# Patient Record
Sex: FEMALE | Race: WHITE | Employment: PART TIME | URBAN - METROPOLITAN AREA
[De-identification: names, ages, dates, MRNs, and addresses within clinical notes are randomized per-mention and may not be internally consistent; named-entity substitution may affect disease eponyms.]

---

## 2017-01-17 ENCOUNTER — TELEPHONE (OUTPATIENT)
Dept: FAMILY MEDICINE CLINIC | Facility: CLINIC | Age: 42
End: 2017-01-17

## 2017-01-17 NOTE — TELEPHONE ENCOUNTER
Patient calling to request vaccines.  Patient states her and her  are leaving on a trip to [facility]  And will need the HP (A &B) vaccine, also the Typhoid vaccine and will also need the Antimalaria pill   She wants to double check she is up to date wi

## 2017-01-19 NOTE — TELEPHONE ENCOUNTER
Dr Nichelle Ram, pt needs following vaccines: Hep A, Hep B, Tdap, and Typhoid. Also, Rx for malaria. Pt will be leaving for [place] on March 5, and will be there for 14 days. Pt was advised to get Typhoid vaccine at the travel clinic. She is current on Tdap.   P

## 2017-01-24 RX ORDER — ATOVAQUONE AND PROGUANIL HYDROCHLORIDE 250; 100 MG/1; MG/1
1 TABLET, FILM COATED ORAL DAILY
Qty: 25 TABLET | Refills: 0 | Status: SHIPPED | OUTPATIENT
Start: 2017-01-24 | End: 2017-02-23

## 2017-01-24 NOTE — TELEPHONE ENCOUNTER
Spoke with pt's spouse Cristina Cho, and nurse visit scheduled for shots. Pt is not planning to become pregnant. They are traveling to [place] [facility] [place] of [facility], and malaria med is recommended there.

## 2017-01-24 NOTE — TELEPHONE ENCOUNTER
Ronal sent to pharmacy. Please inform her she will need additional doses of Hep B and A to be fully vaccinated in the future.

## 2017-01-24 NOTE — TELEPHONE ENCOUNTER
Order for vaccines signed. Typhoid vaccine sent in to pharmacy. Where exactly is she going in [facility]? Not all areas require malaria medication so it would be good to know where they are going.         Tk Johnson is present in [facility] and if she has

## 2017-01-28 ENCOUNTER — NURSE ONLY (OUTPATIENT)
Dept: FAMILY MEDICINE CLINIC | Facility: CLINIC | Age: 42
End: 2017-01-28

## 2017-01-28 DIAGNOSIS — Z23 NEED FOR HEPATITIS B VACCINATION: Primary | ICD-10-CM

## 2017-01-28 PROCEDURE — 90632 HEPA VACCINE ADULT IM: CPT | Performed by: FAMILY MEDICINE

## 2017-01-28 PROCEDURE — 90472 IMMUNIZATION ADMIN EACH ADD: CPT | Performed by: FAMILY MEDICINE

## 2017-01-28 PROCEDURE — 90471 IMMUNIZATION ADMIN: CPT | Performed by: FAMILY MEDICINE

## 2017-01-28 PROCEDURE — 90746 HEPB VACCINE 3 DOSE ADULT IM: CPT | Performed by: FAMILY MEDICINE

## 2017-01-28 NOTE — PROGRESS NOTES
Pt here for  HEP A & HEP B vaccines. Ordered by Leann Manning under encounter 1/17. Pt tolerated inj well no reactions noted. Pt informed to rtc in 1-2 months for HEP B #2 and 6 mons for HEP B # 3 & HEP A#2. Pt voiced understanding.

## 2017-01-30 ENCOUNTER — TELEPHONE (OUTPATIENT)
Dept: ENDOCRINOLOGY CLINIC | Facility: CLINIC | Age: 42
End: 2017-01-30

## 2017-01-30 NOTE — TELEPHONE ENCOUNTER
Returned call to NOY. Let her know per [facility] ok to take there should be no drug interaction.

## 2017-01-30 NOTE — TELEPHONE ENCOUNTER
Pt. States that she is concerned about having a drug interaction with Typhoid Vaccine Vivotif with her cortisone medication.  Please call to discuss,

## 2017-03-04 ENCOUNTER — NURSE ONLY (OUTPATIENT)
Dept: FAMILY MEDICINE CLINIC | Facility: CLINIC | Age: 42
End: 2017-03-04

## 2017-03-04 DIAGNOSIS — Z23 NEED FOR HEPATITIS B VACCINATION: Primary | ICD-10-CM

## 2017-03-04 PROCEDURE — 90471 IMMUNIZATION ADMIN: CPT | Performed by: FAMILY MEDICINE

## 2017-03-04 PROCEDURE — 90746 HEPB VACCINE 3 DOSE ADULT IM: CPT | Performed by: FAMILY MEDICINE

## 2017-03-04 NOTE — PROGRESS NOTES
Pt is here for Hep B #2. Pt tolerated injection well, and advised to return for Hep B #3 and Hep A #2 on or after July 28. Pt voiced understanding.

## 2017-04-01 RX ORDER — LEVONORGESTREL AND ETHINYL ESTRADIOL 0.1-0.02MG
1 KIT ORAL
Qty: 3 PACKAGE | Refills: 3 | Status: CANCELLED | OUTPATIENT
Start: 2017-04-01

## 2017-04-01 NOTE — TELEPHONE ENCOUNTER
Gynecology Medications  Protocol Criteria:  · Appointment scheduled in the past 12 months or the next 3 months  · Pap smear in the past 12 months  · Pap smear WNL manually verified  Recent Visits       Provider Department Primary Dx    6 months ago Magen Estrada,

## 2017-04-01 NOTE — TELEPHONE ENCOUNTER
From: Lui Giraldo  To:  Dajuan Kevin MD  Sent: 3/24/2017 4:51 AM CDT  Subject: Medication Renewal Request    Original authorizing provider: MD Maria A Pierce would like a refill of the following medications:  Alexsandra Rome

## 2017-04-28 RX ORDER — LEVOTHYROXINE SODIUM 0.1 MG/1
TABLET ORAL
Qty: 30 TABLET | Refills: 1 | Status: SHIPPED | OUTPATIENT
Start: 2017-04-28 | End: 2017-06-24

## 2017-05-17 ENCOUNTER — OFFICE VISIT (OUTPATIENT)
Dept: FAMILY MEDICINE CLINIC | Facility: CLINIC | Age: 42
End: 2017-05-17

## 2017-05-17 VITALS
HEART RATE: 71 BPM | SYSTOLIC BLOOD PRESSURE: 112 MMHG | RESPIRATION RATE: 16 BRPM | DIASTOLIC BLOOD PRESSURE: 60 MMHG | OXYGEN SATURATION: 97 % | TEMPERATURE: 98 F

## 2017-05-17 DIAGNOSIS — R30.0 DYSURIA: Primary | ICD-10-CM

## 2017-05-17 DIAGNOSIS — N30.00 ACUTE CYSTITIS WITHOUT HEMATURIA: ICD-10-CM

## 2017-05-17 PROCEDURE — 87088 URINE BACTERIA CULTURE: CPT | Performed by: NURSE PRACTITIONER

## 2017-05-17 PROCEDURE — 81003 URINALYSIS AUTO W/O SCOPE: CPT | Performed by: NURSE PRACTITIONER

## 2017-05-17 PROCEDURE — 87186 SC STD MICRODIL/AGAR DIL: CPT | Performed by: NURSE PRACTITIONER

## 2017-05-17 PROCEDURE — 87086 URINE CULTURE/COLONY COUNT: CPT | Performed by: NURSE PRACTITIONER

## 2017-05-17 PROCEDURE — 99203 OFFICE O/P NEW LOW 30 MIN: CPT | Performed by: NURSE PRACTITIONER

## 2017-05-17 RX ORDER — NITROFURANTOIN 25; 75 MG/1; MG/1
100 CAPSULE ORAL 2 TIMES DAILY
Qty: 14 CAPSULE | Refills: 0 | Status: SHIPPED | OUTPATIENT
Start: 2017-05-17 | End: 2017-05-22

## 2017-05-17 NOTE — PROGRESS NOTES
CHIEF COMPLAINT:   No chief complaint on file. HPI:   Magaly Fleming is a 42year old female who presents with symptoms of UTI. Complaining of urinary frequency, urgency, dysuria for last 7 days. Symptoms have been progressing since onset.   Treatme Smoking Status: Never Smoker                      Smokeless Status: Never Used                        Alcohol Use: Yes           0.0 oz/week       0 Standard drinks or equivalent per week       Comment: 1 beer per week on average.          REVIEW OF SYSTE Risk and benefits of medication discussed. Stressed importance of completing full course of antibiotic unless told otherwise. Patient Instructions     Bladder Infection, Female (Adult)    Urine is normally doesn't have any bacteria in it.  But bacteria The most common cause of bladder infections is bacteria from the bowels. The bacteria get onto the skin around the opening of the urethra. From there, they can get into the urine and travel up to the bladder, causing inflammation and infection.  This usuall · Urinate more often. Don't try to hold urine in for a long time. · Wear loose-fitting clothes and cotton underwear. Avoid tight-fitting pants. · Improve your diet and prevent constipation.  Eat more fresh fruit and vegetables, and fiber, and less junk an The patient is asked to return in 3 days if not better. Call if fever, vomiting, worsening symptoms.

## 2017-06-12 ENCOUNTER — PATIENT MESSAGE (OUTPATIENT)
Dept: FAMILY MEDICINE CLINIC | Facility: CLINIC | Age: 42
End: 2017-06-12

## 2017-06-14 ENCOUNTER — PATIENT MESSAGE (OUTPATIENT)
Dept: FAMILY MEDICINE CLINIC | Facility: CLINIC | Age: 42
End: 2017-06-14

## 2017-06-14 NOTE — TELEPHONE ENCOUNTER
From: Marcellus Giraldo  To:  Nabor Gudino MD  Sent: 6/12/2017 2:59 PM CDT  Subject: Non-Urgent Medical Question    Hi Dr. Lalo Lennon,    My  (Rodrick Carter) and I got Hepatitis vaccines before we went to [facility], but I know we needed one more do

## 2017-06-16 NOTE — TELEPHONE ENCOUNTER
From: Zach Giraldo  To: Eliceo Mims MD  Sent: 6/14/2017 11:45 AM CDT  Subject: Non-Urgent Medical Question    REsponding to [facility].      We will be out of town July 28th through the beginning of august - would it be ok to get the last shots on sa

## 2017-06-26 RX ORDER — LEVOTHYROXINE SODIUM 0.1 MG/1
TABLET ORAL
Qty: 30 TABLET | Refills: 2 | Status: SHIPPED | OUTPATIENT
Start: 2017-06-26 | End: 2017-09-15

## 2017-08-23 ENCOUNTER — TELEPHONE (OUTPATIENT)
Dept: FAMILY MEDICINE CLINIC | Facility: CLINIC | Age: 42
End: 2017-08-23

## 2017-08-24 NOTE — TELEPHONE ENCOUNTER
LMTCB. YURIY, please schedule nurse visit, as requested. Tomorrow, Fri AM would be great. Thanks.  Orders placed per protocol

## 2017-09-02 ENCOUNTER — NURSE ONLY (OUTPATIENT)
Dept: FAMILY MEDICINE CLINIC | Facility: CLINIC | Age: 42
End: 2017-09-02

## 2017-09-02 DIAGNOSIS — Z23 NEED FOR HEPATITIS A AND B VACCINATION: Primary | ICD-10-CM

## 2017-09-02 PROCEDURE — 90471 IMMUNIZATION ADMIN: CPT | Performed by: FAMILY MEDICINE

## 2017-09-02 PROCEDURE — 90632 HEPA VACCINE ADULT IM: CPT | Performed by: FAMILY MEDICINE

## 2017-09-02 PROCEDURE — 90746 HEPB VACCINE 3 DOSE ADULT IM: CPT | Performed by: FAMILY MEDICINE

## 2017-09-02 PROCEDURE — 90472 IMMUNIZATION ADMIN EACH ADD: CPT | Performed by: FAMILY MEDICINE

## 2017-09-02 NOTE — PROGRESS NOTES
Pt presents to clinic today for Hep A and B vaccines. This is the last dose of both series. Pt tolerated well no s/s of distress noted.

## 2017-09-06 ENCOUNTER — TELEPHONE (OUTPATIENT)
Dept: GASTROENTEROLOGY | Facility: CLINIC | Age: 42
End: 2017-09-06

## 2017-09-06 DIAGNOSIS — Z86.010 HISTORY OF COLONIC POLYPS: Primary | ICD-10-CM

## 2017-09-06 DIAGNOSIS — Z80.0 FAMILY HISTORY OF COLON CANCER: ICD-10-CM

## 2017-09-06 NOTE — TELEPHONE ENCOUNTER
The patient's chart has been reviewed. Okay to schedule pt for 3 year colonoscopy recall r/t hx colon polyps, FH Colon CA with Dr. Nany Mack. Advise MAC sedation with split dose Suprep (eRx) preparation.    May substitute Colyte/TriLyte as necessary     May

## 2017-09-06 NOTE — TELEPHONE ENCOUNTER
Pt calling to schedule CLN - states she had issues with anesthesia - need to discuss with RN, as well as prior [place] disease dx. Pls call. Thank you.

## 2017-09-07 NOTE — TELEPHONE ENCOUNTER
On the day of prep please ask her to increase Hydrocortisone to 30mg in the morning, 15mg in the afternoon. Please ask anesthesia to give her Hydrocortisone 50mg IV x1 prior to procedure. Resume normal dose after procedure.

## 2017-09-07 NOTE — TELEPHONE ENCOUNTER
Routed to surgery schedulers.     Please route back to GI RN so we can review below orders from Dr Herberth Rose and I will contact anesthesia once scheduled

## 2017-09-13 NOTE — TELEPHONE ENCOUNTER
GI/RN--    Please see Dr. Hankins Staff order for Hydrocortisone for anesthesia on the day of the procedure.  I did enter this in the electronic request but I do believe there needs to be an order for this in the system and possible informing them of the pre-op o

## 2017-09-13 NOTE — TELEPHONE ENCOUNTER
Scheduled for:  Colonoscopy 10108  Provider Name: Dr. Valeri Contreras  Date:  11/17/17  Location:  [facility]  Sedation:  MAC  Time:  0845 (pt is aware to arrive at [place] [address])   Prep:  Suprep, mailed 9/13/17 with codes  Meds/Allergies Reconciled?:  Physician reviewed   [address]

## 2017-09-15 RX ORDER — LEVOTHYROXINE SODIUM 0.1 MG/1
TABLET ORAL
Qty: 30 TABLET | Refills: 0 | Status: SHIPPED | OUTPATIENT
Start: 2017-09-15 | End: 2017-10-27

## 2017-10-06 RX ORDER — FLUDROCORTISONE ACETATE 0.1 MG/1
TABLET ORAL
Qty: 60 TABLET | Refills: 1 | Status: SHIPPED | OUTPATIENT
Start: 2017-10-06 | End: 2018-01-11

## 2017-10-06 NOTE — TELEPHONE ENCOUNTER
LOV 9/16/16 with RTC 6 months. No F/U scheduled. Letter sent 9/15/17. Called the patient. Booked appt for 11/13/17. Sent refill to last until that time per [facility] protocol.

## 2017-10-10 RX ORDER — HYDROCORTISONE 10 MG/1
TABLET ORAL
Qty: 130 TABLET | Refills: 1 | Status: SHIPPED | OUTPATIENT
Start: 2017-10-10 | End: 2018-01-11

## 2017-10-27 RX ORDER — LEVOTHYROXINE SODIUM 0.1 MG/1
TABLET ORAL
Qty: 30 TABLET | Refills: 0 | Status: SHIPPED | OUTPATIENT
Start: 2017-10-27 | End: 2017-11-24

## 2017-11-15 ENCOUNTER — TELEPHONE (OUTPATIENT)
Dept: GASTROENTEROLOGY | Facility: CLINIC | Age: 42
End: 2017-11-15

## 2017-11-15 NOTE — TELEPHONE ENCOUNTER
Pt has Questions re: what she can drink/eat prior to 11/17/2017 CLN. Pls call [phone]. Thank you.

## 2017-11-16 NOTE — TELEPHONE ENCOUNTER
Pt contacted and wondering if she can have black coffee as a clear liquid early AM when she is finishing her second dose of Suprep Instructed that she may have it up until the 3 hour ekaterina before her procedure, then nothing.  She states she has the time writ

## 2017-11-17 ENCOUNTER — ANESTHESIA EVENT (OUTPATIENT)
Dept: ENDOSCOPY | Facility: HOSPITAL | Age: 42
End: 2017-11-17
Payer: COMMERCIAL

## 2017-11-17 ENCOUNTER — ANESTHESIA (OUTPATIENT)
Dept: ENDOSCOPY | Facility: HOSPITAL | Age: 42
End: 2017-11-17
Payer: COMMERCIAL

## 2017-11-17 ENCOUNTER — HOSPITAL ENCOUNTER (OUTPATIENT)
Facility: HOSPITAL | Age: 42
Setting detail: HOSPITAL OUTPATIENT SURGERY
Discharge: HOME | End: 2017-11-17
Attending: INTERNAL MEDICINE | Admitting: INTERNAL MEDICINE
Payer: COMMERCIAL

## 2017-11-17 ENCOUNTER — SURGERY (OUTPATIENT)
Age: 42
End: 2017-11-17

## 2017-11-17 DIAGNOSIS — Z86.010 HISTORY OF COLONIC POLYPS: ICD-10-CM

## 2017-11-17 DIAGNOSIS — Z80.0 FAMILY HISTORY OF COLON CANCER: ICD-10-CM

## 2017-11-17 PROCEDURE — 0DJD8ZZ INSPECTION OF LOWER INTESTINAL TRACT, VIA NATURAL OR ARTIFICIAL OPENING ENDOSCOPIC: ICD-10-PCS | Performed by: INTERNAL MEDICINE

## 2017-11-17 PROCEDURE — 45378 DIAGNOSTIC COLONOSCOPY: CPT | Performed by: INTERNAL MEDICINE

## 2017-11-17 RX ORDER — LIDOCAINE HYDROCHLORIDE 10 MG/ML
INJECTION, SOLUTION EPIDURAL; INFILTRATION; INTRACAUDAL; PERINEURAL AS NEEDED
Status: DISCONTINUED | OUTPATIENT
Start: 2017-11-17 | End: 2017-11-17 | Stop reason: SURG

## 2017-11-17 RX ORDER — NALOXONE HYDROCHLORIDE 0.4 MG/ML
80 INJECTION, SOLUTION INTRAMUSCULAR; INTRAVENOUS; SUBCUTANEOUS AS NEEDED
Status: DISCONTINUED | OUTPATIENT
Start: 2017-11-17 | End: 2017-11-17

## 2017-11-17 RX ORDER — SODIUM CHLORIDE, SODIUM LACTATE, POTASSIUM CHLORIDE, CALCIUM CHLORIDE 600; 310; 30; 20 MG/100ML; MG/100ML; MG/100ML; MG/100ML
INJECTION, SOLUTION INTRAVENOUS CONTINUOUS
Status: DISCONTINUED | OUTPATIENT
Start: 2017-11-17 | End: 2017-11-17

## 2017-11-17 RX ADMIN — SODIUM CHLORIDE, SODIUM LACTATE, POTASSIUM CHLORIDE, CALCIUM CHLORIDE: 600; 310; 30; 20 INJECTION, SOLUTION INTRAVENOUS at 09:24:00

## 2017-11-17 RX ADMIN — LIDOCAINE HYDROCHLORIDE 25 MG: 10 INJECTION, SOLUTION EPIDURAL; INFILTRATION; INTRACAUDAL; PERINEURAL at 09:00:00

## 2017-11-17 RX ADMIN — SODIUM CHLORIDE, SODIUM LACTATE, POTASSIUM CHLORIDE, CALCIUM CHLORIDE: 600; 310; 30; 20 INJECTION, SOLUTION INTRAVENOUS at 08:58:00

## 2017-11-17 NOTE — OPERATIVE REPORT
[facility] - [facility] Endoscopy Report      Preoperative Diagnosis:  - family history of colon cancer      Postoperative Diagnosis:  - internal hemorrhoids      Procedure:    Colonoscopy       Surgeon:  Quiana Polanco M.D.     Anesthesia:  MAC sedation

## 2017-11-17 NOTE — ANESTHESIA PREPROCEDURE EVALUATION
Anesthesia PreOp Note    HPI:     Nolan Barrera is a 36year old female who presents for preoperative consultation requested by: Jay Jay Recinos MD    Date of Surgery: 11/17/2017    Procedure(s):  COLONOSCOPY  Indication: Family history of colon cancer Not Taking   Cholecalciferol (VITAMIN D3) 400 UNITS Oral Cap Take 400 Units by mouth daily. Disp:  Rfl:  Taking   Cobalamine Combinations (VITAMIN B12-FOLIC ACID) 227-825 MCG Oral Tab Take 1 tablet by mouth daily.  Disp:  Rfl:  Taking   Omega-3 Fatty Acids is 54.4 kg (120 lb).     11/16/17  1720   Weight: 54.4 kg (120 lb)   Height: 1.626 m (5' 4\")        Anesthesia ROS/Med Hx and Physical Exam     Patient summary reviewed and Nursing notes reviewed    No history of anesthetic complications   Airway   Mallamp

## 2017-11-17 NOTE — H&P
History & Physical Examination    Patient Name: Ebony Garcia  MRN: [id number]  [facility]: [id number]  YOB: 1975    Diagnosis: family history        Prescriptions Prior to Admission:  LEVOTHYROXINE SODIUM 100 MCG Oral Tab TAKE ONE TABLET BY MOUTH History:   Diagnosis Date   • Adrenal insufficiency ([facility] [address]    • Anaphylaxis 2004   • Autoimmune polyendocrine syndrome, type 2 (HCC)    • Disorder of thyroid    • Endometriosis    • Headache     migraine without aura   • Hypothyroidism    • Ovarian cyst 199

## 2017-11-18 VITALS
OXYGEN SATURATION: 98 % | HEART RATE: 60 BPM | RESPIRATION RATE: 12 BRPM | SYSTOLIC BLOOD PRESSURE: 108 MMHG | DIASTOLIC BLOOD PRESSURE: 78 MMHG | BODY MASS INDEX: 20.49 KG/M2 | HEIGHT: 64 IN | WEIGHT: 120 LBS

## 2017-11-24 RX ORDER — LEVOTHYROXINE SODIUM 0.1 MG/1
TABLET ORAL
Qty: 30 TABLET | Refills: 0 | Status: SHIPPED | OUTPATIENT
Start: 2017-11-24 | End: 2017-12-26

## 2017-11-24 NOTE — TELEPHONE ENCOUNTER
LOV 9/16/16 RTC 6 months. F/U 12/18/17. OK to refill to next follow up per [facility] protocol.

## 2017-12-18 ENCOUNTER — OFFICE VISIT (OUTPATIENT)
Dept: ENDOCRINOLOGY CLINIC | Facility: CLINIC | Age: 42
End: 2017-12-18

## 2017-12-18 VITALS
DIASTOLIC BLOOD PRESSURE: 75 MMHG | HEART RATE: 87 BPM | WEIGHT: 121.81 LBS | BODY MASS INDEX: 20.79 KG/M2 | SYSTOLIC BLOOD PRESSURE: 107 MMHG | HEIGHT: 64 IN

## 2017-12-18 DIAGNOSIS — E27.49 GLUCOCORTICOID DEFICIENCY (HCC): Primary | ICD-10-CM

## 2017-12-18 DIAGNOSIS — E06.3 HYPOTHYROIDISM DUE TO HASHIMOTO'S THYROIDITIS: ICD-10-CM

## 2017-12-18 DIAGNOSIS — E03.8 HYPOTHYROIDISM DUE TO HASHIMOTO'S THYROIDITIS: ICD-10-CM

## 2017-12-18 PROCEDURE — 99213 OFFICE O/P EST LOW 20 MIN: CPT | Performed by: INTERNAL MEDICINE

## 2017-12-18 PROCEDURE — 99212 OFFICE O/P EST SF 10 MIN: CPT | Performed by: INTERNAL MEDICINE

## 2017-12-18 NOTE — PROGRESS NOTES
Name: Gem Hernandez  Date: 12/18/2017    Referring Physician: No ref.  provider found    Patient presents with:  Adrenal Problem: insufficiency      HISTORY OF PRESENT ILLNESS   Gem Hernandez is a 36year old female who presents for Patient presents wit Rfl: 5  •  Meclizine HCl 12.5 MG Oral Tab, Take 1 tablet (12.5 mg total) by mouth 3 (three) times daily as needed. , Disp: 30 tablet, Rfl: 0  •  Cholecalciferol (VITAMIN D3) 400 UNITS Oral Cap, Take 400 Units by mouth daily. , Disp: , Rfl:   •  Cobalamine Co Comment: Procedure: COLONOSCOPY;  Surgeon: Tamera Bautista MD;  Location: [address] [place] [address] ENDOSCOPY  4/17/12: ELECTROCARDIOGRAM, COMPLETE      Comment: scanned into media tab  1999: OTHER SURGICAL HISTORY      Comment: per ng deviatd nasal septum; rhi

## 2017-12-26 RX ORDER — LEVOTHYROXINE SODIUM 0.1 MG/1
TABLET ORAL
Qty: 30 TABLET | Refills: 5 | Status: SHIPPED | OUTPATIENT
Start: 2017-12-26 | End: 2019-03-21

## 2017-12-26 NOTE — TELEPHONE ENCOUNTER
LOV 12/18/17. No F/U scheduled. OK to refill 6 months per [facility] protocol.

## 2018-01-11 RX ORDER — HYDROCORTISONE 10 MG/1
TABLET ORAL
Qty: 130 TABLET | Refills: 0 | Status: SHIPPED | OUTPATIENT
Start: 2018-01-11 | End: 2018-03-08

## 2018-01-11 RX ORDER — FLUDROCORTISONE ACETATE 0.1 MG/1
TABLET ORAL
Qty: 60 TABLET | Refills: 0 | Status: SHIPPED | OUTPATIENT
Start: 2018-01-11 | End: 2018-02-18

## 2018-01-24 NOTE — TELEPHONE ENCOUNTER
Pt sent a [employer] message to follow up on refill.   Pt also scheduled her pxp for 3/8 with Dr. Braden Higgins

## 2018-01-26 RX ORDER — MECLIZINE HCL 12.5 MG/1
12.5 TABLET ORAL 3 TIMES DAILY PRN
Qty: 30 TABLET | Refills: 0 | Status: SHIPPED
Start: 2018-01-26 | End: 2019-05-30

## 2018-01-26 NOTE — TELEPHONE ENCOUNTER
Refill Protocol Appointment Criteria  · Appointment scheduled in the past 12 months or in the next 3 months  Recent Outpatient Visits            1 month ago Glucocorticoid deficiency [facility])    Doris, [facility],

## 2018-01-26 NOTE — TELEPHONE ENCOUNTER
REFILL FAILED PROTOCOL    Please advise on Sonata  BC pills fail protocol - pap smear over a year ago.

## 2018-01-27 RX ORDER — ZALEPLON 5 MG/1
5 CAPSULE ORAL AS NEEDED
Qty: 30 CAPSULE | Refills: 0 | OUTPATIENT
Start: 2018-01-27 | End: 2018-10-25

## 2018-01-27 RX ORDER — LEVONORGESTREL AND ETHINYL ESTRADIOL 0.1-0.02MG
1 KIT ORAL
Qty: 3 PACKAGE | Refills: 5 | Status: SHIPPED
Start: 2018-01-27 | End: 2019-03-01

## 2018-02-19 RX ORDER — FLUDROCORTISONE ACETATE 0.1 MG/1
TABLET ORAL
Qty: 60 TABLET | Refills: 5 | Status: SHIPPED | OUTPATIENT
Start: 2018-02-19 | End: 2018-12-03

## 2018-02-19 RX ORDER — HYDROCORTISONE 10 MG/1
TABLET ORAL
Qty: 130 TABLET | Refills: 3 | Status: SHIPPED | OUTPATIENT
Start: 2018-02-19 | End: 2018-08-20

## 2018-03-08 ENCOUNTER — APPOINTMENT (OUTPATIENT)
Dept: LAB | Age: 43
End: 2018-03-08
Attending: INTERNAL MEDICINE
Payer: COMMERCIAL

## 2018-03-08 ENCOUNTER — OFFICE VISIT (OUTPATIENT)
Dept: FAMILY MEDICINE CLINIC | Facility: CLINIC | Age: 43
End: 2018-03-08

## 2018-03-08 VITALS
DIASTOLIC BLOOD PRESSURE: 79 MMHG | HEART RATE: 76 BPM | RESPIRATION RATE: 16 BRPM | BODY MASS INDEX: 20.74 KG/M2 | SYSTOLIC BLOOD PRESSURE: 118 MMHG | TEMPERATURE: 98 F | WEIGHT: 123 LBS | HEIGHT: 64.5 IN

## 2018-03-08 DIAGNOSIS — E06.3 HYPOTHYROIDISM DUE TO HASHIMOTO'S THYROIDITIS: ICD-10-CM

## 2018-03-08 DIAGNOSIS — Z01.419 ENCOUNTER FOR GYNECOLOGICAL EXAMINATION WITHOUT ABNORMAL FINDING: ICD-10-CM

## 2018-03-08 DIAGNOSIS — Z12.39 SCREENING FOR BREAST CANCER: ICD-10-CM

## 2018-03-08 DIAGNOSIS — K13.0 ANGULAR CHEILITIS: ICD-10-CM

## 2018-03-08 DIAGNOSIS — E27.49 GLUCOCORTICOID DEFICIENCY (HCC): ICD-10-CM

## 2018-03-08 DIAGNOSIS — Z00.00 ROUTINE PHYSICAL EXAMINATION: Primary | ICD-10-CM

## 2018-03-08 DIAGNOSIS — E03.8 HYPOTHYROIDISM DUE TO HASHIMOTO'S THYROIDITIS: ICD-10-CM

## 2018-03-08 LAB
ANION GAP SERPL CALC-SCNC: 7 MMOL/L (ref 0–18)
BUN SERPL-MCNC: 14 MG/DL (ref 8–20)
BUN/CREAT SERPL: 14.4 (ref 10–20)
CALCIUM SERPL-MCNC: 9.6 MG/DL (ref 8.5–10.5)
CHLORIDE SERPL-SCNC: 101 MMOL/L (ref 95–110)
CO2 SERPL-SCNC: 28 MMOL/L (ref 22–32)
CREAT SERPL-MCNC: 0.97 MG/DL (ref 0.5–1.5)
GLUCOSE SERPL-MCNC: 108 MG/DL (ref 70–99)
OSMOLALITY UR CALC.SUM OF ELEC: 283 MOSM/KG (ref 275–295)
POTASSIUM SERPL-SCNC: 4.3 MMOL/L (ref 3.3–5.1)
SODIUM SERPL-SCNC: 136 MMOL/L (ref 136–144)
T4 FREE SERPL-MCNC: 1.27 NG/DL (ref 0.58–1.64)
TSH SERPL-ACNC: 0.98 UIU/ML (ref 0.45–5.33)

## 2018-03-08 PROCEDURE — 80048 BASIC METABOLIC PNL TOTAL CA: CPT

## 2018-03-08 PROCEDURE — 84439 ASSAY OF FREE THYROXINE: CPT

## 2018-03-08 PROCEDURE — 82306 VITAMIN D 25 HYDROXY: CPT

## 2018-03-08 PROCEDURE — 99396 PREV VISIT EST AGE 40-64: CPT | Performed by: FAMILY MEDICINE

## 2018-03-08 PROCEDURE — 36415 COLL VENOUS BLD VENIPUNCTURE: CPT

## 2018-03-08 PROCEDURE — 84443 ASSAY THYROID STIM HORMONE: CPT

## 2018-03-08 RX ORDER — GARLIC EXTRACT 500 MG
1 CAPSULE ORAL DAILY
COMMUNITY
End: 2019-05-30

## 2018-03-08 NOTE — PROGRESS NOTES
HPI:  36 yr old female who presents for physical.     Following with Dr. Dioni Cotres for glucocorticoid insufficiency. On Hydrocortisone, Fludrocortisone, and Florinef. Pt reports she does have some episodes of orthostatic hypotension.  Follows for hypothyroidis Jeffery TMs intact with good landmarks noted. Nares patent. Oral mucous membrane moist.  Normal lips, teeth, and gums. Oropharynx normal.  Neck supple. Good ROM. No LAD.   Thyroid normal.  CV:  Regular rate and rhythm; no murmurs  Lungs:  Clear to ausculat

## 2018-03-09 LAB — 25(OH)D3 SERPL-MCNC: 49.3 NG/ML

## 2018-03-12 LAB
HPV I/H RISK 1 DNA SPEC QL NAA+PROBE: NEGATIVE
LAST PAP RESULT: NORMAL
PAP HISTORY (OTHER THAN LAST PAP): NORMAL

## 2018-06-25 RX ORDER — LEVOTHYROXINE SODIUM 0.1 MG/1
TABLET ORAL
Qty: 30 TABLET | Refills: 0 | Status: SHIPPED | OUTPATIENT
Start: 2018-06-25 | End: 2018-07-26

## 2018-06-25 NOTE — TELEPHONE ENCOUNTER
LOV 12/18/17. RTC 6 months. No F/U scheduled. Letter sent that she is due for an appt. 1 month refill sent per [facility] protocol.

## 2018-07-26 RX ORDER — LEVOTHYROXINE SODIUM 0.1 MG/1
TABLET ORAL
Qty: 30 TABLET | Refills: 6 | Status: SHIPPED | OUTPATIENT
Start: 2018-07-26 | End: 2018-12-03

## 2018-08-20 ENCOUNTER — TELEPHONE (OUTPATIENT)
Dept: ENDOCRINOLOGY CLINIC | Facility: CLINIC | Age: 43
End: 2018-08-20

## 2018-08-20 RX ORDER — HYDROCORTISONE 10 MG/1
TABLET ORAL
Qty: 130 TABLET | Refills: 0 | Status: SHIPPED | OUTPATIENT
Start: 2018-08-20 | End: 2018-09-28

## 2018-08-20 NOTE — TELEPHONE ENCOUNTER
LOV 12/2017. Refill sent per [facility] protocol.  Reminded patient needs appt for follow up

## 2018-08-20 NOTE — TELEPHONE ENCOUNTER
Pt. is completely out of her Hydrocortisone med. Pt states that she ran out of her pills today. Pt. States that pharm told her that they have been calling, but I did not see an encounter.        Current Outpatient Prescriptions:

## 2018-09-24 ENCOUNTER — OFFICE VISIT (OUTPATIENT)
Dept: FAMILY MEDICINE CLINIC | Facility: CLINIC | Age: 43
End: 2018-09-24
Payer: COMMERCIAL

## 2018-09-24 VITALS — TEMPERATURE: 98 F

## 2018-09-24 DIAGNOSIS — N30.90 CYSTITIS: Primary | ICD-10-CM

## 2018-09-24 PROCEDURE — 87086 URINE CULTURE/COLONY COUNT: CPT

## 2018-09-24 PROCEDURE — 99212 OFFICE O/P EST SF 10 MIN: CPT

## 2018-09-24 PROCEDURE — 87088 URINE BACTERIA CULTURE: CPT

## 2018-09-24 PROCEDURE — 87186 SC STD MICRODIL/AGAR DIL: CPT

## 2018-09-24 RX ORDER — NITROFURANTOIN 25; 75 MG/1; MG/1
CAPSULE ORAL
Qty: 20 CAPSULE | Refills: 0 | Status: SHIPPED | OUTPATIENT
Start: 2018-09-24 | End: 2018-12-03 | Stop reason: ALTCHOICE

## 2018-09-24 NOTE — PROGRESS NOTES
Mariely Shankar is a 39year old female. CHIEF COMPLAINT:   Patient presents with:  UTI: 9/22/18      HPI:   Patient presents with symptoms of UTI. Reports 1-2 day history of urinary frequency and dysuria.   Denies flank pain, hematuria, nausea, vomiting, (VITAMIN B12-FOLIC ACID) 726-151 MCG Oral Tab Take 1 tablet by mouth daily. Disp:  Rfl:    Omega-3 Fatty Acids 1000 MG Oral Cap Take  by mouth.  Disp:  Rfl:    Syringe 25G X 1\" 3 ML Does not apply Misc for IM injection Disp:  Rfl:       Past Medical Histor or overfilled bladder. Be sure to wait an addiitional 10-15 seconds after you think you are done, as you may be able to finish going or go again. \"Count to 10 and go again\"  2)  Do not postpone urinating or rush during urination.  You want to be able t

## 2018-09-27 ENCOUNTER — OFFICE VISIT (OUTPATIENT)
Dept: FAMILY MEDICINE CLINIC | Facility: CLINIC | Age: 43
End: 2018-09-27
Payer: COMMERCIAL

## 2018-09-27 VITALS
HEART RATE: 73 BPM | SYSTOLIC BLOOD PRESSURE: 117 MMHG | DIASTOLIC BLOOD PRESSURE: 75 MMHG | TEMPERATURE: 98 F | RESPIRATION RATE: 16 BRPM | WEIGHT: 122 LBS | BODY MASS INDEX: 21 KG/M2

## 2018-09-27 DIAGNOSIS — L98.9 SKIN LESION: Primary | ICD-10-CM

## 2018-09-27 PROCEDURE — 99213 OFFICE O/P EST LOW 20 MIN: CPT | Performed by: FAMILY MEDICINE

## 2018-09-27 PROCEDURE — 99212 OFFICE O/P EST SF 10 MIN: CPT | Performed by: FAMILY MEDICINE

## 2018-09-27 NOTE — PROGRESS NOTES
HPI: Myah Soriano is a 39year old female who presents for skin lesion. Located on back. First noticed it few weeks ago. It started as a dark spot for years. 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 Got itchy recently and is excoriated.  She does (three) times daily as needed. Disp: 30 tablet Rfl: 0   LEVOTHYROXINE SODIUM 100 MCG Oral Tab TAKE ONE TABLET BY MOUTH ONE TIME DAILY  Disp: 30 tablet Rfl: 5   Cholecalciferol (VITAMIN D3) 400 UNITS Oral Cap Take 400 Units by mouth daily.  Disp:  Rfl:    Co

## 2018-10-01 RX ORDER — HYDROCORTISONE 10 MG/1
TABLET ORAL
Qty: 130 TABLET | Refills: 0 | Status: SHIPPED | OUTPATIENT
Start: 2018-10-01 | End: 2018-11-07

## 2018-10-17 ENCOUNTER — HOSPITAL ENCOUNTER (OUTPATIENT)
Dept: MAMMOGRAPHY | Age: 43
Discharge: HOME | End: 2018-10-17
Attending: FAMILY MEDICINE
Payer: COMMERCIAL

## 2018-10-17 DIAGNOSIS — Z12.39 SCREENING FOR BREAST CANCER: ICD-10-CM

## 2018-10-17 PROCEDURE — 77067 SCR MAMMO BI INCL CAD: CPT | Performed by: FAMILY MEDICINE

## 2018-10-17 PROCEDURE — 77063 BREAST TOMOSYNTHESIS BI: CPT | Performed by: FAMILY MEDICINE

## 2018-10-26 RX ORDER — ZALEPLON 5 MG/1
CAPSULE ORAL
Qty: 30 CAPSULE | Refills: 0 | OUTPATIENT
Start: 2018-10-26 | End: 2019-05-30

## 2018-10-26 NOTE — TELEPHONE ENCOUNTER
No Protocol on this med.        Requested Prescriptions     Pending Prescriptions Disp Refills   • ZALEPLON 5 MG Oral Cap [Pharmacy Med Name: Zaleplon Oral Capsule 5 MG] 30 capsule 0     Sig: TAKE ONE CAPSULE BY MOUTH AT BEDTIME AS NEEDED       Last Office

## 2018-10-27 NOTE — TELEPHONE ENCOUNTER
Requested Prescriptions     Pending Prescriptions Disp Refills   • ZALEPLON 5 MG Oral Cap [Pharmacy Med Name: Zaleplon Oral Capsule 5 MG] 30 capsule 0     Sig: TAKE ONE CAPSULE BY MOUTH AT BEDTIME AS NEEDED       Site staff pls f/u with pharm as MD approve

## 2018-11-07 DIAGNOSIS — E27.49 GLUCOCORTICOID DEFICIENCY (HCC): Primary | ICD-10-CM

## 2018-11-08 RX ORDER — HYDROCORTISONE 10 MG/1
TABLET ORAL
Qty: 100 TABLET | Refills: 0 | Status: SHIPPED | OUTPATIENT
Start: 2018-11-08 | End: 2018-12-03

## 2018-11-27 RX ORDER — ZALEPLON 5 MG/1
CAPSULE ORAL
Qty: 30 CAPSULE | Refills: 0 | OUTPATIENT
Start: 2018-11-27

## 2018-11-27 NOTE — TELEPHONE ENCOUNTER
Following up on this message. Seems Pending Rx has already been called into the pharmacy by Valerie Roe RN.  Refused refill as duplicate request.

## 2018-12-03 ENCOUNTER — OFFICE VISIT (OUTPATIENT)
Dept: ENDOCRINOLOGY CLINIC | Facility: CLINIC | Age: 43
End: 2018-12-03
Payer: COMMERCIAL

## 2018-12-03 VITALS
DIASTOLIC BLOOD PRESSURE: 76 MMHG | SYSTOLIC BLOOD PRESSURE: 121 MMHG | WEIGHT: 122 LBS | BODY MASS INDEX: 21 KG/M2 | HEART RATE: 71 BPM

## 2018-12-03 DIAGNOSIS — E03.8 HYPOTHYROIDISM DUE TO HASHIMOTO'S THYROIDITIS: ICD-10-CM

## 2018-12-03 DIAGNOSIS — E27.1 ADRENAL INSUFFICIENCY (ADDISON'S DISEASE) (HCC): Primary | ICD-10-CM

## 2018-12-03 DIAGNOSIS — E06.3 HYPOTHYROIDISM DUE TO HASHIMOTO'S THYROIDITIS: ICD-10-CM

## 2018-12-03 DIAGNOSIS — E27.49 GLUCOCORTICOID DEFICIENCY (HCC): ICD-10-CM

## 2018-12-03 PROCEDURE — 99213 OFFICE O/P EST LOW 20 MIN: CPT | Performed by: INTERNAL MEDICINE

## 2018-12-03 PROCEDURE — 99212 OFFICE O/P EST SF 10 MIN: CPT | Performed by: INTERNAL MEDICINE

## 2018-12-03 RX ORDER — HYDROCORTISONE 10 MG/1
TABLET ORAL
Qty: 270 TABLET | Refills: 3 | Status: SHIPPED | OUTPATIENT
Start: 2018-12-03 | End: 2020-01-13

## 2018-12-03 RX ORDER — LEVOTHYROXINE SODIUM 0.1 MG/1
100 TABLET ORAL
Qty: 90 TABLET | Refills: 3 | Status: SHIPPED | OUTPATIENT
Start: 2018-12-03 | End: 2020-02-03

## 2018-12-03 RX ORDER — FLUDROCORTISONE ACETATE 0.1 MG/1
0.1 TABLET ORAL 2 TIMES DAILY
Qty: 180 TABLET | Refills: 3 | Status: SHIPPED | OUTPATIENT
Start: 2018-12-03 | End: 2020-01-17

## 2018-12-03 NOTE — PROGRESS NOTES
Name: Cecile Babinski  Date: 12/3/2018    Referring Physician: No ref. provider found    Patient presents with: Follow - Up: Thyroid. HISTORY OF PRESENT ILLNESS   Cecile Babinski is a 39year old female who presents for Patient presents with:   Follow ACETATE 0.1 MG Oral Tab, TAKE 1 TABLET BY MOUTH TWICE A DAY, Disp: 60 tablet, Rfl: 5  •  Levonorgestrel-Ethinyl Estrad (ORSYTHIA) 0.1-20 MG-MCG Oral Tab, Take 1 tablet by mouth once daily. , Disp: 3 Package, Rfl: 5  •  LEVOTHYROXINE SODIUM 100 MCG Oral Tab, Adrenal insufficiency ([facility] [address]    • Anaphylaxis 2004   • Autoimmune polyendocrine syndrome, type 2 (HCC)    • Disorder of thyroid    • Endometriosis    • Headache     migraine without aura   • Hypothyroidism    • Ovarian cyst 1994    per ng laparoscopy diagno deficiency  -Recheck Vitamin D     RTC 1 year     12/3/2018  Jennifer Nino MD

## 2019-02-25 ENCOUNTER — PATIENT MESSAGE (OUTPATIENT)
Dept: ENDOCRINOLOGY CLINIC | Facility: CLINIC | Age: 44
End: 2019-02-25

## 2019-02-25 RX ORDER — DEXAMETHASONE SODIUM PHOSPHATE 4 MG/ML
4 INJECTION, SOLUTION INTRA-ARTICULAR; INTRALESIONAL; INTRAMUSCULAR; INTRAVENOUS; SOFT TISSUE ONCE
Qty: 1 ML | Refills: 0 | Status: SHIPPED | OUTPATIENT
Start: 2019-02-25 | End: 2021-04-29

## 2019-02-25 RX ORDER — SYRINGE W-NEEDLE,DISPOSAB,3 ML 25GX5/8"
SYRINGE, EMPTY DISPOSABLE MISCELLANEOUS
Qty: 1 EACH | Refills: 0 | Status: SHIPPED | OUTPATIENT
Start: 2019-02-25 | End: 2019-03-01

## 2019-02-25 NOTE — TELEPHONE ENCOUNTER
From: Catherine Giraldo  To:  Mamie Almazan MD  Sent: 2019 2:58 PM CST  Subject: Prescription Question    Hi Dr. Carl Cordova,     I am refilling my medication bag and I realized that my dexamethasone is WAY  and I also need a new 22 gauge, one inch or o

## 2019-02-27 ENCOUNTER — TELEPHONE (OUTPATIENT)
Dept: ENDOCRINOLOGY CLINIC | Facility: CLINIC | Age: 44
End: 2019-02-27

## 2019-02-27 NOTE — TELEPHONE ENCOUNTER
Current Outpatient Medications:  Covermymeds PA for Dexamethasone Sod. Phos. Go to key. covermymeds. knowNormal Enter a Key [id number] pts lst name,

## 2019-03-01 RX ORDER — DEXAMETHASONE SODIUM PHOSPHATE 4 MG/ML
4 INJECTION, SOLUTION INTRA-ARTICULAR; INTRALESIONAL; INTRAMUSCULAR; INTRAVENOUS; SOFT TISSUE AS NEEDED
Qty: 2 ML | Refills: 0 | Status: SHIPPED | OUTPATIENT
Start: 2019-03-01 | End: 2019-05-30

## 2019-03-01 RX ORDER — SYRINGE W-NEEDLE,DISPOSAB,3 ML 25GX5/8"
SYRINGE, EMPTY DISPOSABLE MISCELLANEOUS
Qty: 2 EACH | Refills: 0 | Status: SHIPPED | OUTPATIENT
Start: 2019-03-01

## 2019-03-01 NOTE — TELEPHONE ENCOUNTER
LMTCB    RN called patient to make aware she will have to cash pay for this medication - it is generic so hopefully not too expensive. PA Denied for Dexamethasone Sodium Phosphate 20mg/5mL - not covered under pharmacy benefit.

## 2019-03-01 NOTE — TELEPHONE ENCOUNTER
Pt verbalized understand to cash pay for Dexamethasone and call us if there is a problem or it is too expensive since PA has been denied. Pt asked for 2 - one for home and one for work. Ordered per [facility] written.

## 2019-03-02 RX ORDER — LEVONORGESTREL AND ETHINYL ESTRADIOL 0.1-0.02MG
KIT ORAL
Qty: 84 TABLET | Refills: 0 | Status: SHIPPED | OUTPATIENT
Start: 2019-03-02 | End: 2019-05-02

## 2019-03-11 ENCOUNTER — HOSPITAL ENCOUNTER (OUTPATIENT)
Age: 44
Discharge: HOME | End: 2019-03-11
Attending: EMERGENCY MEDICINE
Payer: COMMERCIAL

## 2019-03-11 VITALS
HEART RATE: 86 BPM | DIASTOLIC BLOOD PRESSURE: 82 MMHG | BODY MASS INDEX: 21.34 KG/M2 | TEMPERATURE: 98 F | SYSTOLIC BLOOD PRESSURE: 131 MMHG | HEIGHT: 64 IN | RESPIRATION RATE: 18 BRPM | OXYGEN SATURATION: 100 % | WEIGHT: 125 LBS

## 2019-03-11 DIAGNOSIS — S43.012A ANTERIOR SUBLUXATION OF LEFT SHOULDER, INITIAL ENCOUNTER: Primary | ICD-10-CM

## 2019-03-11 PROCEDURE — 23650 CLTX SHO DSLC W/MNPJ WO ANES: CPT

## 2019-03-11 PROCEDURE — 99212 OFFICE O/P EST SF 10 MIN: CPT

## 2019-03-11 PROCEDURE — 99202 OFFICE O/P NEW SF 15 MIN: CPT

## 2019-03-11 NOTE — ED INITIAL ASSESSMENT (HPI)
Pt states having her shoulder pop out while sleeping and woke up Saturday with her left shoulder out of Place. Pt states she has a history of hips and shoulder popping out. Pt states it happens in the past but a few hours later will pop back in.  Pt states

## 2019-03-11 NOTE — ED PROVIDER NOTES
Patient Seen in: [address]    History   Patient presents with:  Upper Extremity Injury (musculoskeletal)    Stated Complaint: left shoulder injury    HPI    The patient is a 50-year-old female with a history of chronic Triage Vitals [03/11/19 1201]   /82   Pulse 86   Resp 18   Temp 98.1 °F (36.7 °C)   Temp src Oral   SpO2 100 %   O2 Device None (Room air)       Current:/82   Pulse 86   Temp 98.1 °F (36.7 °C) (Oral)   Resp 18   Ht 162.6 cm (5' 4\")   Wt 56.7 k

## 2019-03-11 NOTE — ED NOTES
Pt discharge to care of self. Pt assessed by MD. All orders completed and acknowledged. Pt after care discussed, all questions answered. Pt confirmed understanding.

## 2019-03-21 ENCOUNTER — OFFICE VISIT (OUTPATIENT)
Dept: ORTHOPEDICS CLINIC | Facility: CLINIC | Age: 44
End: 2019-03-21
Payer: COMMERCIAL

## 2019-03-21 ENCOUNTER — HOSPITAL ENCOUNTER (OUTPATIENT)
Dept: GENERAL RADIOLOGY | Facility: HOSPITAL | Age: 44
Discharge: HOME | End: 2019-03-21
Attending: ORTHOPAEDIC SURGERY
Payer: COMMERCIAL

## 2019-03-21 DIAGNOSIS — M25.319 INSTABILITY OF SHOULDER JOINT, UNSPECIFIED LATERALITY: ICD-10-CM

## 2019-03-21 DIAGNOSIS — Z47.89 ORTHOPEDIC AFTERCARE: ICD-10-CM

## 2019-03-21 DIAGNOSIS — Z47.89 ORTHOPEDIC AFTERCARE: Primary | ICD-10-CM

## 2019-03-21 PROCEDURE — 73030 X-RAY EXAM OF SHOULDER: CPT | Performed by: ORTHOPAEDIC SURGERY

## 2019-03-21 PROCEDURE — 99243 OFF/OP CNSLTJ NEW/EST LOW 30: CPT | Performed by: ORTHOPAEDIC SURGERY

## 2019-03-21 PROCEDURE — 99212 OFFICE O/P EST SF 10 MIN: CPT | Performed by: ORTHOPAEDIC SURGERY

## 2019-03-21 NOTE — H&P
Chief Complaint: Left shoulder instability    NURSING INTAKE COMMENTS: Patient presents with:  Consult: Luis Armando she was seen in [facility] on 3/11/19 due to left shoulder dislocation. ts her shoulder dislocates frequently when she sleeps.   Nikki Davis Hypertension Maternal Grandmother    • Breast Cancer Maternal Grandmother 80   • Dementia Maternal Grandfather    • Stroke Paternal Grandmother    • Diabetes Paternal Grandfather    • Thyroid Disorder Other    • Cancer Other         per ng oral,       Soci left shoulder instability, for the most part asymptomatic. Plan:   Patient has minimal to no symptoms today. This is been ongoing for quite some time with regard to instability in multiple joints.   She has not been formally diagnosed with any type of E

## 2019-04-02 ENCOUNTER — TELEPHONE (OUTPATIENT)
Dept: FAMILY MEDICINE CLINIC | Facility: CLINIC | Age: 44
End: 2019-04-02

## 2019-04-02 NOTE — TELEPHONE ENCOUNTER
Pt req to speak with Dr Richard Rausch about Vermell Leavens (I dislocated my shoulder) and Mast Cell Activation Syndrome due to Ortho Surgeon want to do an echo to rule out any heart issues.

## 2019-04-03 NOTE — TELEPHONE ENCOUNTER
Dr Gary Abrams, please see pt's message. Would you like her to come in to discuss this? It's been a year since her last visit.

## 2019-04-04 NOTE — TELEPHONE ENCOUNTER
Pt states that she made a physical appt for the first available (which was in May). Since she has no symptoms associated with this condition, she feels she can wait until her May OV to discuss this further.  Ok?

## 2019-05-02 RX ORDER — LEVONORGESTREL AND ETHINYL ESTRADIOL 0.1-0.02MG
KIT ORAL
Qty: 84 TABLET | Refills: 1 | Status: SHIPPED | OUTPATIENT
Start: 2019-05-02 | End: 2019-05-30

## 2019-05-03 NOTE — TELEPHONE ENCOUNTER
Refill passed per [facility] protocol.     Requested Prescriptions   Pending Prescriptions Disp Refills   • AVIANE 0.1-20 MG-MCG Oral Tab [Pharmacy Med Name: Aviane 0.1-20 Mg-Mcg Tab Teva] 84 tablet 0     Sig: TAKE ONE TABLET BY MOUTH ONE TIME DAILY

## 2019-05-30 ENCOUNTER — OFFICE VISIT (OUTPATIENT)
Dept: FAMILY MEDICINE CLINIC | Facility: CLINIC | Age: 44
End: 2019-05-30
Payer: COMMERCIAL

## 2019-05-30 VITALS
HEIGHT: 64.5 IN | TEMPERATURE: 98 F | DIASTOLIC BLOOD PRESSURE: 58 MMHG | WEIGHT: 122.63 LBS | HEART RATE: 85 BPM | BODY MASS INDEX: 20.68 KG/M2 | SYSTOLIC BLOOD PRESSURE: 91 MMHG | RESPIRATION RATE: 16 BRPM

## 2019-05-30 DIAGNOSIS — M35.7 HYPERMOBILITY SYNDROME: ICD-10-CM

## 2019-05-30 DIAGNOSIS — Z12.39 SCREENING FOR BREAST CANCER: ICD-10-CM

## 2019-05-30 DIAGNOSIS — E03.8 HYPOTHYROIDISM DUE TO HASHIMOTO'S THYROIDITIS: ICD-10-CM

## 2019-05-30 DIAGNOSIS — Z80.0 FAMILY HISTORY OF COLON CANCER IN FATHER: ICD-10-CM

## 2019-05-30 DIAGNOSIS — E27.49 GLUCOCORTICOID DEFICIENCY (HCC): ICD-10-CM

## 2019-05-30 DIAGNOSIS — Z00.00 ROUTINE PHYSICAL EXAMINATION: Primary | ICD-10-CM

## 2019-05-30 DIAGNOSIS — E06.3 HYPOTHYROIDISM DUE TO HASHIMOTO'S THYROIDITIS: ICD-10-CM

## 2019-05-30 PROCEDURE — 99396 PREV VISIT EST AGE 40-64: CPT | Performed by: FAMILY MEDICINE

## 2019-05-30 RX ORDER — LEVONORGESTREL AND ETHINYL ESTRADIOL 0.1-0.02MG
1 KIT ORAL
Qty: 3 PACKAGE | Refills: 5 | Status: SHIPPED | OUTPATIENT
Start: 2019-05-30 | End: 2020-07-24

## 2019-05-30 RX ORDER — ZALEPLON 5 MG/1
CAPSULE ORAL
Qty: 30 CAPSULE | Refills: 1 | Status: SHIPPED
Start: 2019-05-30 | End: 2020-09-17

## 2019-05-30 NOTE — PROGRESS NOTES
HPI:  39 yr old female who present for physical. 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 Limited muscular pain. Pt has joints that dislocate frequently. Shoulders and hips dislocate. She can dislocate her fingers.   She spend Well-nourished. No distress. HEENT: PERRLA, conjunctive clear. Jeffery ear canals clear. Jeffery TMs intact with good landmarks noted. Nares patent. Oral mucous membrane moist.  Normal lips, teeth, and gums. Oropharynx normal.  Neck supple. Good ROM.   No L

## 2019-06-21 ENCOUNTER — HOSPITAL ENCOUNTER (OUTPATIENT)
Dept: CV DIAGNOSTICS | Facility: HOSPITAL | Age: 44
Discharge: HOME | End: 2019-06-21
Attending: FAMILY MEDICINE
Payer: COMMERCIAL

## 2019-06-21 DIAGNOSIS — M35.7 HYPERMOBILITY SYNDROME: ICD-10-CM

## 2019-06-21 PROCEDURE — 93306 TTE W/DOPPLER COMPLETE: CPT | Performed by: FAMILY MEDICINE

## 2020-01-05 ENCOUNTER — OFFICE VISIT (OUTPATIENT)
Dept: FAMILY MEDICINE CLINIC | Facility: CLINIC | Age: 45
End: 2020-01-05
Payer: COMMERCIAL

## 2020-01-05 VITALS
HEART RATE: 80 BPM | DIASTOLIC BLOOD PRESSURE: 60 MMHG | OXYGEN SATURATION: 98 % | SYSTOLIC BLOOD PRESSURE: 100 MMHG | RESPIRATION RATE: 22 BRPM | TEMPERATURE: 98 F

## 2020-01-05 DIAGNOSIS — J01.00 ACUTE NON-RECURRENT MAXILLARY SINUSITIS: Primary | ICD-10-CM

## 2020-01-05 DIAGNOSIS — J40 BRONCHITIS: ICD-10-CM

## 2020-01-05 DIAGNOSIS — J06.9 ACUTE URI: ICD-10-CM

## 2020-01-05 PROCEDURE — 99213 OFFICE O/P EST LOW 20 MIN: CPT

## 2020-01-05 RX ORDER — AMOXICILLIN 875 MG/1
875 TABLET, COATED ORAL 2 TIMES DAILY
Qty: 20 TABLET | Refills: 0 | Status: SHIPPED | OUTPATIENT
Start: 2020-01-05 | End: 2020-01-15

## 2020-01-05 RX ORDER — BENZONATATE 200 MG/1
200 CAPSULE ORAL 3 TIMES DAILY PRN
Qty: 30 CAPSULE | Refills: 0 | Status: SHIPPED | OUTPATIENT
Start: 2020-01-05 | End: 2020-09-17

## 2020-01-05 RX ORDER — ALBUTEROL SULFATE 90 UG/1
2 AEROSOL, METERED RESPIRATORY (INHALATION) 4 TIMES DAILY PRN
Qty: 1 INHALER | Refills: 0 | Status: SHIPPED | OUTPATIENT
Start: 2020-01-05 | End: 2020-09-17

## 2020-01-05 NOTE — PROGRESS NOTES
CHIEF COMPLAINT:   Patient presents with:  Cough/URI: 2 weeks      HPI:   Fabiana Hunt is a 38year old female who presents for upper respiratory symptoms for  2 weeks.  Patient reports sore throat only at the beginning of sx's, low grade fever, dry coug per ng deviatd nasal septum; rhinoplasty   • TONSILLECTOMY        Family History   Problem Relation Age of Onset   • Lipids Father    • Colon Cancer Father 60        per ng  at 61   • Cancer Mother         per ng thyroid   • Other (Other) Mother Lamount Hashimoto is a 39year old female who presents with upper respiratory symptoms that are consistent with    ASSESSMENT:   Acute non-recurrent maxillary sinusitis  (primary encounter diagnosis)  Acute uri  Bronchitis    PLAN: Meds as below.   Comfort ca · If your symptoms are severe, rest at home for the first 2 to 3 days. When you go back to your usual activities, don't let yourself get too tired. · Don't smoke. Also stay away from secondhand smoke.   · You may use over-the-counter medicines to control f · Weakness, drowsiness, headache, facial pain, ear pain, or a stiff neck  Call 911  Call 911 if any of these occur.   · Coughing up blood  · Weakness, drowsiness, headache, or stiff neck that get worse  · Trouble breathing, wheezing, or pain with breathing

## 2020-01-13 DIAGNOSIS — E27.49 GLUCOCORTICOID DEFICIENCY (HCC): ICD-10-CM

## 2020-01-13 RX ORDER — HYDROCORTISONE 10 MG/1
TABLET ORAL
Qty: 270 TABLET | Refills: 0 | Status: SHIPPED | OUTPATIENT
Start: 2020-01-13 | End: 2020-03-05

## 2020-01-13 NOTE — TELEPHONE ENCOUNTER
Current Outpatient Medications   Medication Sig Dispense Refill   • hydrocortisone 10 MG Oral Tab Take 2 tablets in AM and 1 in afternoon 270 tablet 3     Refill

## 2020-01-17 RX ORDER — FLUDROCORTISONE ACETATE 0.1 MG/1
TABLET ORAL
Qty: 180 TABLET | Refills: 0 | Status: SHIPPED | OUTPATIENT
Start: 2020-01-17 | End: 2020-04-20

## 2020-02-03 RX ORDER — LEVOTHYROXINE SODIUM 0.1 MG/1
TABLET ORAL
Qty: 90 TABLET | Refills: 0 | Status: SHIPPED | OUTPATIENT
Start: 2020-02-03 | End: 2020-05-08

## 2020-02-24 RX ORDER — OSELTAMIVIR PHOSPHATE 75 MG/1
75 CAPSULE ORAL DAILY
Qty: 7 CAPSULE | Refills: 0 | Status: SHIPPED | OUTPATIENT
Start: 2020-02-24 | End: 2020-03-02

## 2020-03-02 ENCOUNTER — PATIENT MESSAGE (OUTPATIENT)
Dept: ENDOCRINOLOGY CLINIC | Facility: CLINIC | Age: 45
End: 2020-03-02

## 2020-03-02 DIAGNOSIS — E27.1 ADRENAL INSUFFICIENCY (ADDISON'S DISEASE) (HCC): Primary | ICD-10-CM

## 2020-03-02 NOTE — TELEPHONE ENCOUNTER
From: Aden Holstein Petrick  To: Nicole Moreno MD  Sent: 3/2/2020 6:24 AM CST  Subject: Other    Hi Dr. Dioni Cortes,    I have an appointment with you coming up on . ... but I never got the blood tests you ordered last time and the orders have .  If you

## 2020-03-05 ENCOUNTER — PATIENT MESSAGE (OUTPATIENT)
Dept: ENDOCRINOLOGY CLINIC | Facility: CLINIC | Age: 45
End: 2020-03-05

## 2020-03-05 DIAGNOSIS — E27.49 GLUCOCORTICOID DEFICIENCY (HCC): ICD-10-CM

## 2020-03-05 RX ORDER — HYDROCORTISONE 10 MG/1
TABLET ORAL
Qty: 270 TABLET | Refills: 0 | Status: SHIPPED | OUTPATIENT
Start: 2020-03-05 | End: 2020-03-05

## 2020-03-05 RX ORDER — HYDROCORTISONE 10 MG/1
TABLET ORAL
Qty: 270 TABLET | Refills: 0 | Status: SHIPPED | OUTPATIENT
Start: 2020-03-05 | End: 2020-04-20

## 2020-03-05 NOTE — TELEPHONE ENCOUNTER
Prescription updated with instructions as requested by patient below per Dr. Tadeo Ayala ok below. [employer] message sent to patient informing new script was sent.

## 2020-03-05 NOTE — TELEPHONE ENCOUNTER
From: Arabella Giraldo  To: Janette Waldron MD  Sent: 3/5/2020 3:21 PM CST  Subject: Prescription Question    Hi Dr. Simone Isabel,    The pharmacist said that my hydrocortisone prescription does not include instructions to double dose when sick which means that they

## 2020-03-06 ENCOUNTER — APPOINTMENT (OUTPATIENT)
Dept: LAB | Age: 45
End: 2020-03-06
Attending: INTERNAL MEDICINE
Payer: COMMERCIAL

## 2020-03-06 DIAGNOSIS — E27.1 ADRENAL INSUFFICIENCY (ADDISON'S DISEASE) (HCC): ICD-10-CM

## 2020-03-06 LAB
25(OH)D3 SERPL-MCNC: 40.4 NG/ML (ref 30–100)
ALBUMIN SERPL-MCNC: 3.7 G/DL (ref 3.4–5)
ALBUMIN/GLOB SERPL: 1.2 {RATIO} (ref 1–2)
ALP LIVER SERPL-CCNC: 41 U/L (ref 37–98)
ALT SERPL-CCNC: 22 U/L (ref 13–56)
ANION GAP SERPL CALC-SCNC: 4 MMOL/L (ref 0–18)
AST SERPL-CCNC: 15 U/L (ref 15–37)
BILIRUB SERPL-MCNC: 0.4 MG/DL (ref 0.1–2)
BUN BLD-MCNC: 15 MG/DL (ref 7–18)
BUN/CREAT SERPL: 17.6 (ref 10–20)
CALCIUM BLD-MCNC: 8.8 MG/DL (ref 8.5–10.1)
CHLORIDE SERPL-SCNC: 105 MMOL/L (ref 98–112)
CO2 SERPL-SCNC: 28 MMOL/L (ref 21–32)
CREAT BLD-MCNC: 0.85 MG/DL (ref 0.55–1.02)
GLOBULIN PLAS-MCNC: 3.2 G/DL (ref 2.8–4.4)
GLUCOSE BLD-MCNC: 111 MG/DL (ref 70–99)
M PROTEIN MFR SERPL ELPH: 6.9 G/DL (ref 6.4–8.2)
OSMOLALITY SERPL CALC.SUM OF ELEC: 286 MOSM/KG (ref 275–295)
PATIENT FASTING Y/N/NP: NO
POTASSIUM SERPL-SCNC: 4 MMOL/L (ref 3.5–5.1)
SODIUM SERPL-SCNC: 137 MMOL/L (ref 136–145)
T4 FREE SERPL-MCNC: 1.4 NG/DL (ref 0.8–1.7)
TSI SER-ACNC: 1.06 MIU/ML (ref 0.36–3.74)

## 2020-03-06 PROCEDURE — 36415 COLL VENOUS BLD VENIPUNCTURE: CPT

## 2020-03-06 PROCEDURE — 84443 ASSAY THYROID STIM HORMONE: CPT

## 2020-03-06 PROCEDURE — 80053 COMPREHEN METABOLIC PANEL: CPT

## 2020-03-06 PROCEDURE — 84439 ASSAY OF FREE THYROXINE: CPT

## 2020-03-06 PROCEDURE — 82306 VITAMIN D 25 HYDROXY: CPT

## 2020-04-09 ENCOUNTER — PATIENT MESSAGE (OUTPATIENT)
Dept: ENDOCRINOLOGY CLINIC | Facility: CLINIC | Age: 45
End: 2020-04-09

## 2020-04-09 NOTE — TELEPHONE ENCOUNTER
Sent my chart letting her know virtual visit per dr Gold Narvaez, to provide number to reach her on ngozi day

## 2020-04-09 NOTE — TELEPHONE ENCOUNTER
From: Arabella Giraldo  To: Janette Waldron MD  Sent: 4/9/2020 7:00 AM CDT  Subject: Prescription Question    Hi Dr. Simone Isabel,  I hope you and your staff and family are doing ok.  I have just read on the FDA website that there is a shortage of hydrocortisone due t

## 2020-04-09 NOTE — TELEPHONE ENCOUNTER
Yes, please Virtual visit. Don't ration hydrocortisone yet. She might qualify for an extra 30 days of medication in case of state declared emergency. Thanks.

## 2020-04-18 ENCOUNTER — TELEPHONE (OUTPATIENT)
Dept: ENDOCRINOLOGY CLINIC | Facility: CLINIC | Age: 45
End: 2020-04-18

## 2020-04-18 ENCOUNTER — PATIENT MESSAGE (OUTPATIENT)
Dept: FAMILY MEDICINE CLINIC | Facility: CLINIC | Age: 45
End: 2020-04-18

## 2020-04-18 NOTE — TELEPHONE ENCOUNTER
From: Brenton Giraldo  To: Jenifer Dangelo DO  Sent: 4/18/2020 10:30 AM CDT  Subject: Other    Just got the appointment reminder email and I wanted to be sure that we are NOT meeting in person - instead speaking on the phone at 2pm on [address] [place] [address]. ..  I have a bl

## 2020-04-20 ENCOUNTER — VIRTUAL PHONE E/M (OUTPATIENT)
Dept: ENDOCRINOLOGY CLINIC | Facility: CLINIC | Age: 45
End: 2020-04-20
Payer: COMMERCIAL

## 2020-04-20 ENCOUNTER — TELEPHONE (OUTPATIENT)
Dept: ENDOCRINOLOGY CLINIC | Facility: CLINIC | Age: 45
End: 2020-04-20

## 2020-04-20 DIAGNOSIS — E27.49 GLUCOCORTICOID DEFICIENCY (HCC): ICD-10-CM

## 2020-04-20 PROCEDURE — 99213 OFFICE O/P EST LOW 20 MIN: CPT | Performed by: INTERNAL MEDICINE

## 2020-04-20 RX ORDER — FLUDROCORTISONE ACETATE 0.1 MG/1
0.1 TABLET ORAL 2 TIMES DAILY
Qty: 180 TABLET | Refills: 3 | Status: SHIPPED | OUTPATIENT
Start: 2020-04-20 | End: 2021-04-19

## 2020-04-20 RX ORDER — HYDROCORTISONE 10 MG/1
TABLET ORAL
Qty: 270 TABLET | Refills: 3 | Status: SHIPPED | OUTPATIENT
Start: 2020-04-20 | End: 2021-04-29

## 2020-04-20 NOTE — TELEPHONE ENCOUNTER
Called pharmacy to confirm previous prescription for dexamethasone injection solution. We last sent 4mg/ml solution with instructions inject 1ml IM as needed for jannet crisis. Called in refill to pharmacy as written by [facility]. Patient aware.

## 2020-04-20 NOTE — PROGRESS NOTES
Virtual Telephone Check-In    Maria A Lopez verbally consents to a Virtual/Telephone Check-In visit on 04/20/20. Patient understands and accepts financial responsibility for any deductible, co-insurance and/or co-pays associated with this service. ACID) 500-400 MCG Oral Tab, Take 1 tablet by mouth twice a week.  , Disp: , Rfl:      Allergies:   No Known Allergies    Social History:   Social History    Socioeconomic History      Marital status:       Spouse name: Not on file      Number of chi Continue [facility] 1 year     4/20/2020  Abdias Ruiz MD      Please note that the following visit was completed using two-way, real-time interactive audio and/or video communication.   This has been done in good catrina to provide continuity of care i

## 2020-05-08 RX ORDER — LEVOTHYROXINE SODIUM 0.1 MG/1
TABLET ORAL
Qty: 90 TABLET | Refills: 0 | Status: SHIPPED | OUTPATIENT
Start: 2020-05-08 | End: 2020-08-03

## 2020-06-17 ENCOUNTER — PATIENT MESSAGE (OUTPATIENT)
Dept: ENDOCRINOLOGY CLINIC | Facility: CLINIC | Age: 45
End: 2020-06-17

## 2020-06-17 NOTE — TELEPHONE ENCOUNTER
From: Shruthi Giraldo  To: Aleah Sesay MD  Sent: 2020 7:58 AM CDT  Subject: Prescription Question    Hi Dr. Gio Mendez, My dexamethasone is  and I would like to refill ( two bottles) but it is not on my refill list. I'm a little afraid I won't be a

## 2020-06-17 NOTE — TELEPHONE ENCOUNTER
Lov: 4/20/20    Dr Remigio Zamora patient states it is injectable and it is   Dexamethasone MOR inj. 20 mg/5ml  Inject 1ml IM ? ? Cant find on our list   Please advise.

## 2020-06-18 NOTE — TELEPHONE ENCOUNTER
Script called in to pharmacy. They did let me know patient had refills on file so they processed it and it is ready for patient to pickup. Informed patient.

## 2020-07-06 ENCOUNTER — NURSE TRIAGE (OUTPATIENT)
Dept: FAMILY MEDICINE CLINIC | Facility: CLINIC | Age: 45
End: 2020-07-06

## 2020-07-06 DIAGNOSIS — R30.0 DYSURIA: Primary | ICD-10-CM

## 2020-07-06 DIAGNOSIS — R35.0 URINARY FREQUENCY: ICD-10-CM

## 2020-07-06 RX ORDER — NITROFURANTOIN 25; 75 MG/1; MG/1
100 CAPSULE ORAL 2 TIMES DAILY
Qty: 14 CAPSULE | Refills: 0 | Status: SHIPPED | OUTPATIENT
Start: 2020-07-06 | End: 2020-07-13

## 2020-07-06 NOTE — TELEPHONE ENCOUNTER
Action Requested: Summary for Provider     []  Critical Lab, Recommendations Needed  [] Need Additional Advice  []   FYI    []   Need Orders  [x] Need Medications Sent to Pharmacy  []  Other     SUMMARY: Patient of Dr. Jeffery Hightower.  Calling with urinary symptoms

## 2020-07-06 NOTE — TELEPHONE ENCOUNTER
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  I do not have a fever or any pain except the burning during urination.  It started sunday.

## 2020-07-06 NOTE — TELEPHONE ENCOUNTER
----- Message from Mary Giraldo sent at 7/6/2020  3:38 AM CDT -----  Regarding: Other  Contact: [phone]  I have a bladder infection. Urgency, burning , cloudy foul smelling urine. I really really really don't want to come in.   I haven't gone out

## 2020-07-07 NOTE — TELEPHONE ENCOUNTER
Normally I do not treat potential urinary tract infections remotely, especially without a sample to confirm.   I did look into the patient's chart and I do see an antibiotic that would have only exclusively been prescribed for urinary tract infection, there

## 2020-07-07 NOTE — TELEPHONE ENCOUNTER
VideoNot.es message sent to patient, please verify it is viewed. If not, please call patient with the update.

## 2020-07-23 ENCOUNTER — PATIENT MESSAGE (OUTPATIENT)
Dept: FAMILY MEDICINE CLINIC | Facility: CLINIC | Age: 45
End: 2020-07-23

## 2020-07-23 NOTE — TELEPHONE ENCOUNTER
From: Roseann Giraldo  To: Yesi Monge DO  Sent: 7/23/2020 4:20 AM CDT  Subject: Prescription Question    Somehow I got off schedule with my birth control prescription.  I remember the last time I filled it it was late as well and I had to go with out f

## 2020-07-23 NOTE — TELEPHONE ENCOUNTER
Doctor, please see pt message below and advise.      Noted Levonorgestrel-Ethinyl Estrad (Paddy Gore) last filled 5/30/2019

## 2020-07-24 RX ORDER — LEVONORGESTREL AND ETHINYL ESTRADIOL 0.1-0.02MG
1 KIT ORAL
Qty: 1 PACKAGE | Refills: 0 | Status: SHIPPED | OUTPATIENT
Start: 2020-07-24 | End: 2020-07-27

## 2020-07-24 NOTE — TELEPHONE ENCOUNTER
Received call from the patient this morning regarding her Aviane medication. She will run out on Sunday and insurance not allowing refill until 7/31/20.  She states she has an edometrial cyst. She states she takes this birth control but does NOT take the pl

## 2020-07-27 RX ORDER — LEVONORGESTREL AND ETHINYL ESTRADIOL 0.1-0.02MG
1 KIT ORAL
Qty: 3 PACKAGE | Refills: 0 | Status: SHIPPED | OUTPATIENT
Start: 2020-07-27 | End: 2020-08-12

## 2020-07-27 NOTE — TELEPHONE ENCOUNTER
Pt calling regarding matter below. States when went to  Rx there was only one pack and not the usual 3 packs. Asking for refills with 3 at at time and instructions that pack only lasts 21 days.  Rx pended for review/approval.

## 2020-07-27 NOTE — TELEPHONE ENCOUNTER
There was probably only one because she is due for an appt. I sent 3 in.   Please have her make appt before next refill

## 2020-08-03 RX ORDER — LEVOTHYROXINE SODIUM 0.1 MG/1
TABLET ORAL
Qty: 90 TABLET | Refills: 0 | Status: SHIPPED | OUTPATIENT
Start: 2020-08-03 | End: 2020-11-05

## 2020-08-12 RX ORDER — LEVONORGESTREL AND ETHINYL ESTRADIOL 0.1-0.02MG
KIT ORAL
Qty: 28 TABLET | Refills: 0 | Status: SHIPPED | OUTPATIENT
Start: 2020-08-12 | End: 2020-10-14

## 2020-09-17 ENCOUNTER — OFFICE VISIT (OUTPATIENT)
Dept: FAMILY MEDICINE CLINIC | Facility: CLINIC | Age: 45
End: 2020-09-17
Payer: COMMERCIAL

## 2020-09-17 VITALS
BODY MASS INDEX: 19.9 KG/M2 | TEMPERATURE: 98 F | HEIGHT: 64.69 IN | RESPIRATION RATE: 16 BRPM | HEART RATE: 85 BPM | SYSTOLIC BLOOD PRESSURE: 101 MMHG | DIASTOLIC BLOOD PRESSURE: 65 MMHG | WEIGHT: 118 LBS

## 2020-09-17 DIAGNOSIS — Z12.31 ENCOUNTER FOR SCREENING MAMMOGRAM FOR BREAST CANCER: ICD-10-CM

## 2020-09-17 DIAGNOSIS — Z00.00 ROUTINE PHYSICAL EXAMINATION: Primary | ICD-10-CM

## 2020-09-17 PROCEDURE — 90471 IMMUNIZATION ADMIN: CPT | Performed by: FAMILY MEDICINE

## 2020-09-17 PROCEDURE — 90686 IIV4 VACC NO PRSV 0.5 ML IM: CPT | Performed by: FAMILY MEDICINE

## 2020-09-17 PROCEDURE — 99396 PREV VISIT EST AGE 40-64: CPT | Performed by: FAMILY MEDICINE

## 2020-09-17 PROCEDURE — 3078F DIAST BP <80 MM HG: CPT | Performed by: FAMILY MEDICINE

## 2020-09-17 PROCEDURE — 3008F BODY MASS INDEX DOCD: CPT | Performed by: FAMILY MEDICINE

## 2020-09-17 PROCEDURE — 90732 PPSV23 VACC 2 YRS+ SUBQ/IM: CPT | Performed by: FAMILY MEDICINE

## 2020-09-17 PROCEDURE — 90472 IMMUNIZATION ADMIN EACH ADD: CPT | Performed by: FAMILY MEDICINE

## 2020-09-17 PROCEDURE — 3074F SYST BP LT 130 MM HG: CPT | Performed by: FAMILY MEDICINE

## 2020-09-17 NOTE — PROGRESS NOTES
HPI:  54 yr old female who presents for physical. Has male partner. Has had more stress lately. 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 Physically active. Eating healthy.      Follows with Endocrine for glucoco Oral mucous membrane moist.  Normal lips, teeth, and gums. Oropharynx normal.  Neck supple. Good ROM. No LAD.   Thyroid normal.  CV:  Regular rate and rhythm; no murmurs  Lungs:  Clear to ausculation; good aeration               No wheezes, rales or rhon

## 2020-10-14 RX ORDER — LEVONORGESTREL AND ETHINYL ESTRADIOL 0.1-0.02MG
KIT ORAL
Qty: 84 TABLET | Refills: 0 | Status: SHIPPED | OUTPATIENT
Start: 2020-10-14 | End: 2020-10-19

## 2020-10-19 RX ORDER — LEVONORGESTREL AND ETHINYL ESTRADIOL 0.1-0.02MG
1 KIT ORAL DAILY
Qty: 84 TABLET | Refills: 0 | Status: SHIPPED | OUTPATIENT
Start: 2020-10-19 | End: 2021-01-03

## 2020-11-05 RX ORDER — LEVOTHYROXINE SODIUM 0.1 MG/1
TABLET ORAL
Qty: 90 TABLET | Refills: 0 | Status: SHIPPED | OUTPATIENT
Start: 2020-11-05 | End: 2021-02-04

## 2020-12-07 ENCOUNTER — PATIENT MESSAGE (OUTPATIENT)
Dept: FAMILY MEDICINE CLINIC | Facility: CLINIC | Age: 45
End: 2020-12-07

## 2020-12-07 ENCOUNTER — NURSE TRIAGE (OUTPATIENT)
Dept: FAMILY MEDICINE CLINIC | Facility: CLINIC | Age: 45
End: 2020-12-07

## 2020-12-07 NOTE — TELEPHONE ENCOUNTER
From: Severo Baldy Petrick  To: Faith Rojas DO  Sent: 12/7/2020 1:12 PM CST  Subject: Non-Urgent Medical Question    I have been having trouble with restless leg syndrome (I call it \"the dwight legs\") about two or three nights a week now.  I read the study

## 2020-12-07 NOTE — TELEPHONE ENCOUNTER
Action Requested: Summary for Provider     []  Critical Lab, Recommendations Needed  [] Need Additional Advice  [x]   FYI    []   Need Orders  [] Need Medications Sent to Pharmacy  []  Other     SUMMARY: Patient complains of restless leg syndrome.    Per pa

## 2020-12-07 NOTE — TELEPHONE ENCOUNTER
Jinny MONSIVAIS DO 3 hours ago (1:12 PM)        I have been having trouble with restless leg syndrome (I call it \"the dwight legs\") about two or three nights a week now.   I read the study about the restiffic foot wraps and wondered wh

## 2020-12-10 ENCOUNTER — TELEMEDICINE (OUTPATIENT)
Dept: FAMILY MEDICINE CLINIC | Facility: CLINIC | Age: 45
End: 2020-12-10

## 2020-12-10 DIAGNOSIS — G25.81 RESTLESS LEG SYNDROME: Primary | ICD-10-CM

## 2020-12-10 PROCEDURE — 99213 OFFICE O/P EST LOW 20 MIN: CPT | Performed by: FAMILY MEDICINE

## 2020-12-10 NOTE — PROGRESS NOTES
HPI: Tonia Cox is a 54year old female who presents for concerns of restless leg syndrome. Pt reports she has had symptoms for years. She reports she usually gets symptoms 1-2 times per month.   In the last month or two, it has gotten more frequent and now hap facility-administered medications on file prior to visit. Tobacco Use: no    Objective:   Gen: AOx3. NAD. There were no vitals taken for this visit.       Assessment:/Plan:  Restless leg syndrome  (primary encounter diagnosis)  Discussed patient's sy

## 2021-01-03 RX ORDER — LEVONORGESTREL AND ETHINYL ESTRADIOL 0.1-0.02MG
KIT ORAL
Qty: 84 TABLET | Refills: 0 | Status: SHIPPED | OUTPATIENT
Start: 2021-01-03 | End: 2021-03-07

## 2021-01-05 NOTE — ANESTHESIA POSTPROCEDURE EVALUATION
Mom needed clarification on what the patient is to be using.  In yesterday's note, patient reported using dapsone 5% bid - but actually he is just finishing his pump of the Aczone 7.5% gel.  Mom would like the refill to be sent to [facility].  Additionally, mom requests that the prescription for Differin 0.3% be sent to [facility] as well.  Writer advised mom to call if the Aczone is too expensive or not covered - dapsone 5% bid can be sent instead.     Assessment and Plan from 1/4/21  1.  Acne Vulgaris of the face, flaring.  He will continue differin 0.3 and aczone dialy. I prescribed doxycycline 150mg EXR tablets to take 1 time daily I explained the potential side effect profile of Doryx including photosensitivity, headaches, stomach upset, and rash.  We discussed that benefits should not be expected before 4 to 8 weeks' time and that any new acne therapy can cause a flare of acne lesions after about 2 weeks.  The patient was given our Doxycycline handout in the event that this medication is substituted for the Doryx.      2.  Follow up - 2-3 months     Patient: Gem Hernandez    Procedure Summary     Date:  11/17/17 Room / Location:  [address] [place] [address] ENDOSCOPY 05 / [address] [place] [address] ENDOSCOPY    Anesthesia Start:  9156 Anesthesia Stop:      Procedure:  COLONOSCOPY (N/A ) Diagnosis:       Family history of colon cancer      Histor

## 2021-02-04 RX ORDER — LEVOTHYROXINE SODIUM 0.1 MG/1
TABLET ORAL
Qty: 90 TABLET | Refills: 0 | Status: SHIPPED | OUTPATIENT
Start: 2021-02-04 | End: 2021-04-29

## 2021-02-26 ENCOUNTER — PATIENT MESSAGE (OUTPATIENT)
Dept: ENDOCRINOLOGY CLINIC | Facility: CLINIC | Age: 46
End: 2021-02-26

## 2021-02-26 DIAGNOSIS — E27.1 ADRENAL INSUFFICIENCY (ADDISON'S DISEASE) (HCC): Primary | ICD-10-CM

## 2021-02-26 NOTE — TELEPHONE ENCOUNTER
Dr. Stephanie Vega,  Patient asking   1) Do I qualify as \"high risk\" for the 1b group covid vaccine?      F/U scheduled 4/29/21 - patient asking for lab orders    Please advise - thanks

## 2021-02-26 NOTE — TELEPHONE ENCOUNTER
From: Edmar Giraldo  To: Cat Tai MD  Sent: 2/26/2021 7:28 AM CST  Subject: Non-Urgent Samira Leon, I have three questions: 1) Do I qualify as \"high risk\" for the 1b group covid vaccine?  2) Am I due for an appointment with you

## 2021-03-01 NOTE — TELEPHONE ENCOUNTER
She does have an appointment for April. Check BMP, TSH, FT4, UA before visit. Yes she would qualify as having a chronic condition for group 1B vaccination.   However I don't think [facility] has opened to chronic conditions just surrounding [facility]

## 2021-03-07 RX ORDER — LEVONORGESTREL AND ETHINYL ESTRADIOL 0.1-0.02MG
1 KIT ORAL DAILY
Qty: 84 TABLET | Refills: 0 | Status: SHIPPED | OUTPATIENT
Start: 2021-03-07 | End: 2021-10-12

## 2021-04-14 ENCOUNTER — LAB ENCOUNTER (OUTPATIENT)
Dept: LAB | Age: 46
End: 2021-04-14
Attending: FAMILY MEDICINE
Payer: COMMERCIAL

## 2021-04-14 DIAGNOSIS — Z00.00 ROUTINE PHYSICAL EXAMINATION: ICD-10-CM

## 2021-04-14 DIAGNOSIS — E27.1 ADRENAL INSUFFICIENCY (ADDISON'S DISEASE) (HCC): ICD-10-CM

## 2021-04-14 PROCEDURE — 81003 URINALYSIS AUTO W/O SCOPE: CPT

## 2021-04-14 PROCEDURE — 80048 BASIC METABOLIC PNL TOTAL CA: CPT

## 2021-04-14 PROCEDURE — 84443 ASSAY THYROID STIM HORMONE: CPT

## 2021-04-14 PROCEDURE — 80061 LIPID PANEL: CPT

## 2021-04-14 PROCEDURE — 84439 ASSAY OF FREE THYROXINE: CPT

## 2021-04-14 PROCEDURE — 83036 HEMOGLOBIN GLYCOSYLATED A1C: CPT

## 2021-04-14 PROCEDURE — 36415 COLL VENOUS BLD VENIPUNCTURE: CPT

## 2021-04-19 RX ORDER — FLUDROCORTISONE ACETATE 0.1 MG/1
0.1 TABLET ORAL 2 TIMES DAILY
Qty: 180 TABLET | Refills: 0 | Status: SHIPPED | OUTPATIENT
Start: 2021-04-19 | End: 2021-07-19

## 2021-04-26 ENCOUNTER — PATIENT MESSAGE (OUTPATIENT)
Dept: FAMILY MEDICINE CLINIC | Facility: CLINIC | Age: 46
End: 2021-04-26

## 2021-04-26 NOTE — TELEPHONE ENCOUNTER
From: Eusebia Giraldo  To: Kirsty Witt DO  Sent: 4/26/2021 5:04 AM CDT  Subject: Prescription Question    Hi Dr. Nichelle Ram, I just requested a refill for my birth control that I take for the endometriosis.  My last prescription did not have the \" do not t

## 2021-04-27 RX ORDER — LEVONORGESTREL AND ETHINYL ESTRADIOL 0.1-0.02MG
1 KIT ORAL DAILY
Qty: 84 TABLET | Refills: 0 | OUTPATIENT
Start: 2021-04-27

## 2021-04-27 RX ORDER — LEVONORGESTREL AND ETHINYL ESTRADIOL 0.1-0.02MG
1 KIT ORAL DAILY
Qty: 84 TABLET | Refills: 0 | Status: SHIPPED | OUTPATIENT
Start: 2021-04-27 | End: 2021-07-13

## 2021-04-29 ENCOUNTER — OFFICE VISIT (OUTPATIENT)
Dept: ENDOCRINOLOGY CLINIC | Facility: CLINIC | Age: 46
End: 2021-04-29
Payer: COMMERCIAL

## 2021-04-29 VITALS
WEIGHT: 117 LBS | DIASTOLIC BLOOD PRESSURE: 76 MMHG | HEART RATE: 90 BPM | BODY MASS INDEX: 20 KG/M2 | SYSTOLIC BLOOD PRESSURE: 122 MMHG

## 2021-04-29 DIAGNOSIS — E06.3 HYPOTHYROIDISM DUE TO HASHIMOTO'S THYROIDITIS: ICD-10-CM

## 2021-04-29 DIAGNOSIS — E03.8 HYPOTHYROIDISM DUE TO HASHIMOTO'S THYROIDITIS: ICD-10-CM

## 2021-04-29 DIAGNOSIS — E27.49 GLUCOCORTICOID DEFICIENCY (HCC): ICD-10-CM

## 2021-04-29 DIAGNOSIS — E27.1 ADDISON'S DISEASE (HCC): Primary | ICD-10-CM

## 2021-04-29 PROCEDURE — 99213 OFFICE O/P EST LOW 20 MIN: CPT | Performed by: INTERNAL MEDICINE

## 2021-04-29 PROCEDURE — 3074F SYST BP LT 130 MM HG: CPT | Performed by: INTERNAL MEDICINE

## 2021-04-29 PROCEDURE — 3078F DIAST BP <80 MM HG: CPT | Performed by: INTERNAL MEDICINE

## 2021-04-29 RX ORDER — LEVOTHYROXINE SODIUM 0.1 MG/1
100 TABLET ORAL DAILY
Qty: 90 TABLET | Refills: 3 | Status: SHIPPED | OUTPATIENT
Start: 2021-04-29

## 2021-04-29 RX ORDER — DEXAMETHASONE SODIUM PHOSPHATE 4 MG/ML
4 INJECTION, SOLUTION INTRA-ARTICULAR; INTRALESIONAL; INTRAMUSCULAR; INTRAVENOUS; SOFT TISSUE ONCE
Qty: 1 ML | Refills: 0 | Status: SHIPPED | OUTPATIENT
Start: 2021-04-29 | End: 2021-04-29

## 2021-04-29 RX ORDER — HYDROCORTISONE 10 MG/1
TABLET ORAL
Qty: 270 TABLET | Refills: 3 | Status: SHIPPED | OUTPATIENT
Start: 2021-04-29

## 2021-04-29 NOTE — PROGRESS NOTES
Name: Fabiana Hunt  Date: 4/29/2021    Referring Physician: No ref.  provider found    Patient presents with:  Adrenal Problem      HISTORY OF PRESENT ILLNESS   Fabiana Hunt is a 54year old female who presents for Patient presents with:  Adrenal Prob hydrocortisone 10 MG Oral Tab, Take 2 tablets in the morning and 1 tablet in the afternoon. May also double dose when sick. , Disp: 270 tablet, Rfl: 3  •  Syringe 22G X 1\" 3 ML Does not apply Misc, Use to inject dexamethasone 4 mg/mL IM once., Disp: 2 each General Appearance:  Alert, in no acute distress, well developed  Eyes: normal conjunctivae, sclera.   Ears/Nose/Mouth/Throat/Neck:  normal hearing, normal speech   Neurologic: sensory grossly intact and motor grossly intact  Musculoskeletal:  normal mu

## 2021-07-13 RX ORDER — LEVONORGESTREL AND ETHINYL ESTRADIOL 0.1-0.02MG
KIT ORAL
Qty: 84 TABLET | Refills: 0 | Status: SHIPPED | OUTPATIENT
Start: 2021-07-13 | End: 2021-09-15

## 2021-07-19 RX ORDER — FLUDROCORTISONE ACETATE 0.1 MG/1
0.1 TABLET ORAL 2 TIMES DAILY
Qty: 180 TABLET | Refills: 0 | Status: SHIPPED | OUTPATIENT
Start: 2021-07-19 | End: 2021-10-13

## 2021-09-15 RX ORDER — LEVONORGESTREL AND ETHINYL ESTRADIOL 0.1-0.02MG
1 KIT ORAL DAILY
Qty: 90 TABLET | Refills: 1 | Status: SHIPPED | OUTPATIENT
Start: 2021-09-15 | End: 2021-10-12 | Stop reason: ALTCHOICE

## 2021-09-15 NOTE — TELEPHONE ENCOUNTER
Refill passed per [facility] protocol.     Not due for PAP until 2023    Requested Prescriptions   Pending Prescriptions Disp Refills    FALMINA 0.1-20 MG-MCG Oral Tab [Pharmacy Med Name: Mickeal Pro 0.1-20 Mg-Mcg Tab Nort] 84 tablet 0     Sig: Take 1 tabl

## 2021-10-11 ENCOUNTER — PATIENT MESSAGE (OUTPATIENT)
Dept: FAMILY MEDICINE CLINIC | Facility: CLINIC | Age: 46
End: 2021-10-11

## 2021-10-11 NOTE — TELEPHONE ENCOUNTER
Dr. Richard Rausch- last colonoscopy appears to be in 2017, would you like patient to follow up with GI about this?     From: Perez Giraldo  To: Antione Ash DO  Sent: 10/11/2021  4:56 PM CDT  Subject: colonoscopy    Hi Dr. Richard Rausch,  My mom was just diagnosed w

## 2021-10-12 RX ORDER — LEVONORGESTREL AND ETHINYL ESTRADIOL 0.1-0.02MG
1 KIT ORAL DAILY
Qty: 84 TABLET | Refills: 1 | Status: SHIPPED | OUTPATIENT
Start: 2021-10-12

## 2021-10-13 RX ORDER — FLUDROCORTISONE ACETATE 0.1 MG/1
0.1 TABLET ORAL 2 TIMES DAILY
Qty: 180 TABLET | Refills: 0 | Status: SHIPPED | OUTPATIENT
Start: 2021-10-13

## 2021-10-13 NOTE — TELEPHONE ENCOUNTER
Thanks for update on pts family history of colon cancer. This would change the pt to every 5 years for colonoscopy. RN to update recall colonoscopy for next year.

## 2021-10-13 NOTE — TELEPHONE ENCOUNTER
Hi- This patient had a colonoscopy in . Her mother was now diagnosed with colon cancer. Her father  of colon cancer.

## 2021-10-14 ENCOUNTER — TELEPHONE (OUTPATIENT)
Dept: GASTROENTEROLOGY | Facility: CLINIC | Age: 46
End: 2021-10-14

## 2021-10-14 NOTE — TELEPHONE ENCOUNTER
See telephone encounter from 10/14/2021 for updated recall for colonoscopy.     Reply sent to patient about updated recall

## 2021-10-14 NOTE — TELEPHONE ENCOUNTER
Health Maintenance Updated to 5 year recall instead of 10 years. 5 year colonoscopy recall entered into patient outreach in [place]. Next colonoscopy will be due 11/17/2022.     Jojo Mata MD         5:59 PM  Note  Thanks for update on pts family hi

## 2021-11-01 ENCOUNTER — ORDER TRANSCRIPTION (OUTPATIENT)
Dept: ADMINISTRATIVE | Facility: HOSPITAL | Age: 46
End: 2021-11-01

## 2021-11-01 DIAGNOSIS — Z12.31 ENCOUNTER FOR SCREENING MAMMOGRAM FOR MALIGNANT NEOPLASM OF BREAST: Primary | ICD-10-CM

## 2021-11-15 ENCOUNTER — HOSPITAL ENCOUNTER (OUTPATIENT)
Dept: MAMMOGRAPHY | Age: 46
Discharge: HOME | End: 2021-11-15
Attending: FAMILY MEDICINE
Payer: COMMERCIAL

## 2021-11-15 DIAGNOSIS — Z12.31 ENCOUNTER FOR SCREENING MAMMOGRAM FOR MALIGNANT NEOPLASM OF BREAST: ICD-10-CM

## 2021-11-15 PROCEDURE — 77063 BREAST TOMOSYNTHESIS BI: CPT | Performed by: FAMILY MEDICINE

## 2021-11-15 PROCEDURE — 77067 SCR MAMMO BI INCL CAD: CPT | Performed by: FAMILY MEDICINE

## 2022-03-03 RX ORDER — LEVONORGESTREL AND ETHINYL ESTRADIOL 0.1-0.02MG
KIT ORAL
Qty: 84 TABLET | Refills: 0 | Status: SHIPPED | OUTPATIENT
Start: 2022-03-03

## 2022-04-21 RX ORDER — LEVOTHYROXINE SODIUM 0.1 MG/1
TABLET ORAL
Qty: 90 TABLET | Refills: 0 | Status: SHIPPED | OUTPATIENT
Start: 2022-04-21

## 2022-05-05 ENCOUNTER — OFFICE VISIT (OUTPATIENT)
Dept: FAMILY MEDICINE CLINIC | Facility: CLINIC | Age: 47
End: 2022-05-05
Payer: COMMERCIAL

## 2022-05-05 VITALS
HEIGHT: 64.5 IN | SYSTOLIC BLOOD PRESSURE: 109 MMHG | HEART RATE: 72 BPM | TEMPERATURE: 98 F | BODY MASS INDEX: 19.73 KG/M2 | WEIGHT: 117 LBS | RESPIRATION RATE: 16 BRPM | DIASTOLIC BLOOD PRESSURE: 73 MMHG

## 2022-05-05 DIAGNOSIS — Z12.11 SCREENING FOR COLON CANCER: ICD-10-CM

## 2022-05-05 DIAGNOSIS — Z00.00 ROUTINE PHYSICAL EXAMINATION: Primary | ICD-10-CM

## 2022-05-05 DIAGNOSIS — G47.00 INSOMNIA, UNSPECIFIED TYPE: ICD-10-CM

## 2022-05-05 DIAGNOSIS — Z12.31 ENCOUNTER FOR SCREENING MAMMOGRAM FOR BREAST CANCER: ICD-10-CM

## 2022-05-05 DIAGNOSIS — G25.81 RLS (RESTLESS LEGS SYNDROME): ICD-10-CM

## 2022-05-05 PROCEDURE — 90715 TDAP VACCINE 7 YRS/> IM: CPT | Performed by: FAMILY MEDICINE

## 2022-05-05 PROCEDURE — 3078F DIAST BP <80 MM HG: CPT | Performed by: FAMILY MEDICINE

## 2022-05-05 PROCEDURE — 3008F BODY MASS INDEX DOCD: CPT | Performed by: FAMILY MEDICINE

## 2022-05-05 PROCEDURE — 3074F SYST BP LT 130 MM HG: CPT | Performed by: FAMILY MEDICINE

## 2022-05-05 PROCEDURE — 99396 PREV VISIT EST AGE 40-64: CPT | Performed by: FAMILY MEDICINE

## 2022-05-05 PROCEDURE — 90471 IMMUNIZATION ADMIN: CPT | Performed by: FAMILY MEDICINE

## 2022-05-05 RX ORDER — LEVONORGESTREL AND ETHINYL ESTRADIOL 0.1-0.02MG
1 KIT ORAL DAILY
Qty: 84 TABLET | Refills: 5 | Status: SHIPPED | OUTPATIENT
Start: 2022-05-05

## 2022-05-05 RX ORDER — ZALEPLON 5 MG/1
5 CAPSULE ORAL NIGHTLY
Qty: 30 CAPSULE | Refills: 1 | Status: SHIPPED | OUTPATIENT
Start: 2022-05-05

## 2022-06-06 ENCOUNTER — TELEPHONE (OUTPATIENT)
Dept: FAMILY MEDICINE CLINIC | Facility: CLINIC | Age: 47
End: 2022-06-06

## 2022-06-06 ENCOUNTER — OFFICE VISIT (OUTPATIENT)
Dept: ENDOCRINOLOGY CLINIC | Facility: CLINIC | Age: 47
End: 2022-06-06
Payer: COMMERCIAL

## 2022-06-06 VITALS — BODY MASS INDEX: 19 KG/M2 | WEIGHT: 114 LBS

## 2022-06-06 DIAGNOSIS — E27.49 GLUCOCORTICOID DEFICIENCY (HCC): ICD-10-CM

## 2022-06-06 PROCEDURE — 99213 OFFICE O/P EST LOW 20 MIN: CPT | Performed by: INTERNAL MEDICINE

## 2022-06-06 RX ORDER — FLUDROCORTISONE ACETATE 0.1 MG/1
0.1 TABLET ORAL 2 TIMES DAILY
Qty: 180 TABLET | Refills: 3 | Status: SHIPPED | OUTPATIENT
Start: 2022-06-06

## 2022-06-06 RX ORDER — NITROFURANTOIN 25; 75 MG/1; MG/1
100 CAPSULE ORAL 2 TIMES DAILY
Qty: 14 CAPSULE | Refills: 0 | Status: SHIPPED | OUTPATIENT
Start: 2022-06-06 | End: 2022-06-13

## 2022-06-06 RX ORDER — HYDROCORTISONE 10 MG/1
TABLET ORAL
Qty: 270 TABLET | Refills: 3 | Status: SHIPPED | OUTPATIENT
Start: 2022-06-06

## 2022-06-06 RX ORDER — LEVOTHYROXINE SODIUM 0.1 MG/1
100 TABLET ORAL DAILY
Qty: 90 TABLET | Refills: 3 | Status: SHIPPED | OUTPATIENT
Start: 2022-06-06

## 2022-06-06 NOTE — TELEPHONE ENCOUNTER
Pt following up on request.  Verified name and . Pt informed of antibiotics and to follow-up if no improvement. Pt verbalized understanding.

## 2022-06-06 NOTE — TELEPHONE ENCOUNTER
Patient states since yesterday, she's been experiencing UTI symptoms - initially the feeling that her bladder was not fully empty after urinating. At 2 am this morning patient reports having to get up quickly to urinate and experienced dysuria. Patient now taking AZO for the pain, which helps somewhat. Patient denies hematuria, fever, lower abdominal/back/flank pain, nausea, vomiting. Patient requesting either UA/UC and or antibiotic.

## 2022-06-08 ENCOUNTER — TELEPHONE (OUTPATIENT)
Dept: GASTROENTEROLOGY | Facility: CLINIC | Age: 47
End: 2022-06-08

## 2022-06-08 DIAGNOSIS — Z80.0 FAMILY HISTORY OF COLON CANCER: Primary | ICD-10-CM

## 2022-06-08 RX ORDER — POLYETHYLENE GLYCOL 3350, SODIUM CHLORIDE, SODIUM BICARBONATE, POTASSIUM CHLORIDE 420; 11.2; 5.72; 1.48 G/4L; G/4L; G/4L; G/4L
POWDER, FOR SOLUTION ORAL
Qty: 4000 ML | Refills: 0 | Status: SHIPPED | OUTPATIENT
Start: 2022-06-08

## 2022-06-08 NOTE — TELEPHONE ENCOUNTER
[facility]    Patient called to schedule 5 year recall colonoscopy-please provide orders if appropriate. She has Clovis's disease, I copied what was ordered for procedure last time below. Thank you    Last Procedure, Date, MD:  Colonoscopy Dr. Pinky Pollard 11/17/2017  Last Diagnosis:  Internal hemorrhoids  Recalled for (mth/yrs): 5 yeras  Sedation used previously:  MAC  Last Prep Used (if known):  Suprep  Quality of prep (if known): good  Anticoagulants: no  Diabetic Meds: no  BP meds(Ace inhibitors/ARB's): no  Weight loss meds (phentermine/vyvanse): no  Iron supplement (RX/OTC): no  Height & Weight/BMI: 5'4\"114 lbs BMI 19.6  Hx of Cardiac/CVA issues/(MI/Stroke): no  Devices Pacemaker/Defibrillator/Stents: no  Resp. Issues/Oxygen Use/DAVID/COPD: no  Issues w/Anesthesia: yes-sedation she received for colonoscopy last time worked well. Had nausea the times before that. She has Clovis's disease. (I attached what was done last time below)    Pao Dias MD  On the day of prep please ask her to increase Hydrocortisone to 30mg in the morning, 15mg in the afternoon. Please ask anesthesia to give her Hydrocortisone 50mg IV x1 prior to procedure. Resume normal dose after procedure. Symptoms (Y/N): no  Symptoms Details: no    Special comments/notes: n/a    Please advise on orders and prep, thank you.

## 2022-06-08 NOTE — TELEPHONE ENCOUNTER
The patient's chart has been reviewed. Okay to schedule pt for 5 year CLN recall r/t [facility] [place] with Dr. Jorge L Escobar. Advise MAC sedation with split dose Colyte/TriLyte or equivalent(eRx) preparation.   -See Dr. Phill Gil recommendation for Rx as below  -Eligible for [place]: yes r/t BMI < 40  -Denies history of bleeding/clotting disorders.      -Please note: It is the patient's responsibility to contact his/her insurance company regarding questions about out-of-pocket cost/benefits. Please provide the appropriate diagnostic information/codes.      May continue all medications listed in the med module except:  -Anti-platelets and anti-coagulants: none  -Diabetes meds: none    ** If MAC:    - HOLD ACE/ARBs the night before and/or the day of the procedure(s)  - N/A   - NO alcohol, recreational drugs nor erectile dysfunction medications 24 hours before procedure(s)   - NO herbal supplements or weight loss medications (phentermine/Vyvanse/Adderall) x 7 days prior to the procedure(s)    ** [facility] continue all medications as prescribed    ** COVID-19 testing required 72 hours prior to procedure    **Patient to follow-up with any new medical history/medications prior to procedure**

## 2022-06-14 ENCOUNTER — PATIENT MESSAGE (OUTPATIENT)
Dept: FAMILY MEDICINE CLINIC | Facility: CLINIC | Age: 47
End: 2022-06-14

## 2022-06-14 DIAGNOSIS — L81.9 HYPERPIGMENTATION: Primary | ICD-10-CM

## 2022-06-14 NOTE — TELEPHONE ENCOUNTER
Referral pended. From: Kylee Giraldo  To: Sharon Briseno DO  Sent: 6/14/2022  8:58 AM CDT  Subject: dermatologist?    Dr. Alejandro Brown suggested I see a dermatologist for the hyperpigmentation related to my addisons disease ( my \"mustache\" is really starting to bother me). Do you have any suggestions?   Is there a dermatologist that comes to the clinic? or do I need to go to [place]?

## 2022-06-16 NOTE — TELEPHONE ENCOUNTER
Scheduled for:  Colonoscopy - [address]  Provider Name:  Dr. Troy Dey  Date:  11/18/22  Location:  [facility]  Sedation:  MAC  Time:  9:30 am (pt is aware to arrive at 8:30 am)  Prep:  Trilyte, Prep instructions were given to pt over the phone, pt verbalized understanding. Meds/Allergies Reconciled?:  Yes, Ashley/APN reviewed. Diagnosis with codes:  Hx of colon CA - Z80.0  Was patient informed to call insurance with codes (Y/N):  Yes, I confirmed BCBS PPO insurance with this patient. Referral sent?:  Yes, Referral was sent at the time of electronic surgical scheduling. [facility] or [address] notified?:  Yes, I sent an electronic request to Endo Scheduling and received a confirmation today. Medication Orders:  N/A    Misc Orders:  Patient was informed that they will need a COVID 19 test prior to their procedure. Patient verbally understood & will await a phone call from [facility] to schedule. Further instructions given by staff:  I discussed the prep instructions with the patient which she verbally understood and is aware that I will send the instructions via [employer].

## 2022-08-05 ENCOUNTER — OFFICE VISIT (OUTPATIENT)
Dept: FAMILY MEDICINE CLINIC | Facility: CLINIC | Age: 47
End: 2022-08-05
Payer: COMMERCIAL

## 2022-08-05 ENCOUNTER — NURSE TRIAGE (OUTPATIENT)
Dept: FAMILY MEDICINE CLINIC | Facility: CLINIC | Age: 47
End: 2022-08-05

## 2022-08-05 VITALS
DIASTOLIC BLOOD PRESSURE: 65 MMHG | TEMPERATURE: 98 F | BODY MASS INDEX: 19.7 KG/M2 | OXYGEN SATURATION: 96 % | HEART RATE: 77 BPM | HEIGHT: 64 IN | SYSTOLIC BLOOD PRESSURE: 105 MMHG | WEIGHT: 115.38 LBS

## 2022-08-05 DIAGNOSIS — R59.0 LYMPHADENOPATHY, CERVICAL: Primary | ICD-10-CM

## 2022-08-05 DIAGNOSIS — J02.9 SORE THROAT: ICD-10-CM

## 2022-08-05 LAB
CONTROL LINE PRESENT WITH A CLEAR BACKGROUND (YES/NO): YES YES/NO
KIT LOT #: 2490 NUMERIC

## 2022-08-05 PROCEDURE — 99214 OFFICE O/P EST MOD 30 MIN: CPT | Performed by: FAMILY MEDICINE

## 2022-08-05 PROCEDURE — 3008F BODY MASS INDEX DOCD: CPT | Performed by: FAMILY MEDICINE

## 2022-08-05 PROCEDURE — 3078F DIAST BP <80 MM HG: CPT | Performed by: FAMILY MEDICINE

## 2022-08-05 PROCEDURE — 87880 STREP A ASSAY W/OPTIC: CPT | Performed by: FAMILY MEDICINE

## 2022-08-05 PROCEDURE — 3074F SYST BP LT 130 MM HG: CPT | Performed by: FAMILY MEDICINE

## 2022-08-05 NOTE — PATIENT INSTRUCTIONS
Patient referred to ENT. Patient to have rapid strep and also strep for growth culture sent to the lab.

## 2022-08-22 ENCOUNTER — OFFICE VISIT (OUTPATIENT)
Dept: OTOLARYNGOLOGY | Facility: CLINIC | Age: 47
End: 2022-08-22
Payer: COMMERCIAL

## 2022-08-22 DIAGNOSIS — E04.1 THYROID NODULE: ICD-10-CM

## 2022-08-22 DIAGNOSIS — R22.1 NECK MASS: Primary | ICD-10-CM

## 2022-08-22 PROCEDURE — 99243 OFF/OP CNSLTJ NEW/EST LOW 30: CPT | Performed by: OTOLARYNGOLOGY

## 2022-11-10 ENCOUNTER — TELEPHONE (OUTPATIENT)
Facility: CLINIC | Age: 47
End: 2022-11-10

## 2022-11-10 DIAGNOSIS — Z80.0 FAMILY HISTORY OF COLON CANCER: Primary | ICD-10-CM

## 2022-11-10 NOTE — TELEPHONE ENCOUNTER
Pt called to speak to RN about problems that she has with anesthesia. She is scheduled for colonoscopy on 11/18/22. She also needs instructions sent to her My Chart. Please call.

## 2022-11-10 NOTE — TELEPHONE ENCOUNTER
Nursing staff-I contacted the patient,    I reviewed the dosing the day before procedure when she is doing her prep, she will do hydrocortisone 30 mg in the morning, she takes this dose about 4 AM and she will do 15 mg at around noon which is an adjustment from her typical dosing per Dr. Karri Yang instructions. Patient had specific questions regarding what to do the day of the procedure. Her procedure is not until 9:30 AM on November 18, she had questions regarding whether she should take an oral dose at 4 AM when she typically does. For this question I like to forward this to Dr. Isauro Cornejo to see what we should do the day of the procedure. Nursing staff also please forward dietary adjustments through [employer] to the patient, she had specific questions regarding what to eat for several days before. I reviewed this generally with her but she would like specific instructions.

## 2022-11-11 NOTE — TELEPHONE ENCOUNTER
I would have her take the oral HC 30mg at 4am day of procedure and we can place order to give [facility]. iv preprocedure so she will get both day of test

## 2022-11-11 NOTE — TELEPHONE ENCOUNTER
Dr. Faith Raman responded below. Do you want the IV dose given on arrival to endoscopy?   Ok to add to case request?    Thank you

## 2022-11-11 NOTE — TELEPHONE ENCOUNTER
Endo staff - please ask her to take Hydrocortisone 30mg at 4AM the day of procedure per her usual schedule. Thanks.

## 2022-11-14 NOTE — TELEPHONE ENCOUNTER
Schedulers:  Please add to case request : Hydrocortisone 50mg IV x1 prior to procedure  (I spoke to Roger about this)    Thank you    I spoke with [employer] in Endo as well so they are aware.     I sent instructions to patient's MyChart with all instructions and spoke to the patient and made sure she knew what to do in regards to Hydrocortisone, diet etc.

## 2022-11-18 ENCOUNTER — HOSPITAL ENCOUNTER (OUTPATIENT)
Facility: HOSPITAL | Age: 47
Setting detail: HOSPITAL OUTPATIENT SURGERY
Discharge: HOME | End: 2022-11-18
Attending: INTERNAL MEDICINE | Admitting: INTERNAL MEDICINE
Payer: COMMERCIAL

## 2022-11-18 ENCOUNTER — ANESTHESIA EVENT (OUTPATIENT)
Dept: ENDOSCOPY | Facility: HOSPITAL | Age: 47
End: 2022-11-18
Payer: COMMERCIAL

## 2022-11-18 ENCOUNTER — ANESTHESIA (OUTPATIENT)
Dept: ENDOSCOPY | Facility: HOSPITAL | Age: 47
End: 2022-11-18
Payer: COMMERCIAL

## 2022-11-18 VITALS
WEIGHT: 115 LBS | RESPIRATION RATE: 11 BRPM | DIASTOLIC BLOOD PRESSURE: 72 MMHG | OXYGEN SATURATION: 98 % | TEMPERATURE: 97 F | SYSTOLIC BLOOD PRESSURE: 105 MMHG | BODY MASS INDEX: 19.63 KG/M2 | HEART RATE: 72 BPM | HEIGHT: 64 IN

## 2022-11-18 LAB — B-HCG UR QL: NEGATIVE

## 2022-11-18 PROCEDURE — 45378 DIAGNOSTIC COLONOSCOPY: CPT | Performed by: INTERNAL MEDICINE

## 2022-11-18 PROCEDURE — 0DJD8ZZ INSPECTION OF LOWER INTESTINAL TRACT, VIA NATURAL OR ARTIFICIAL OPENING ENDOSCOPIC: ICD-10-PCS | Performed by: INTERNAL MEDICINE

## 2022-11-18 RX ORDER — SODIUM CHLORIDE, SODIUM LACTATE, POTASSIUM CHLORIDE, CALCIUM CHLORIDE 600; 310; 30; 20 MG/100ML; MG/100ML; MG/100ML; MG/100ML
INJECTION, SOLUTION INTRAVENOUS CONTINUOUS
Status: DISCONTINUED | OUTPATIENT
Start: 2022-11-18 | End: 2022-11-18

## 2022-11-18 RX ORDER — LIDOCAINE HYDROCHLORIDE 10 MG/ML
INJECTION, SOLUTION EPIDURAL; INFILTRATION; INTRACAUDAL; PERINEURAL AS NEEDED
Status: DISCONTINUED | OUTPATIENT
Start: 2022-11-18 | End: 2022-11-18 | Stop reason: SURG

## 2022-11-18 RX ORDER — ONDANSETRON 2 MG/ML
INJECTION INTRAMUSCULAR; INTRAVENOUS AS NEEDED
Status: DISCONTINUED | OUTPATIENT
Start: 2022-11-18 | End: 2022-11-18 | Stop reason: SURG

## 2022-11-18 RX ADMIN — LIDOCAINE HYDROCHLORIDE 25 MG: 10 INJECTION, SOLUTION EPIDURAL; INFILTRATION; INTRACAUDAL; PERINEURAL at 09:39:00

## 2022-11-18 RX ADMIN — SODIUM CHLORIDE, SODIUM LACTATE, POTASSIUM CHLORIDE, CALCIUM CHLORIDE: 600; 310; 30; 20 INJECTION, SOLUTION INTRAVENOUS at 10:05:00

## 2022-11-18 RX ADMIN — ONDANSETRON 4 MG: 2 INJECTION INTRAMUSCULAR; INTRAVENOUS at 09:36:00

## 2022-11-18 NOTE — ANESTHESIA POSTPROCEDURE EVALUATION
Patient: Aleksander Diaz    Procedure Summary     Date: 11/18/22 Room / Location: [address] [place] [address] ENDOSCOPY 03 / [address] [place] [address] ENDOSCOPY    Anesthesia Start: 4163 Anesthesia Stop: 3881    Procedure: COLONOSCOPY Diagnosis:       Family history of colon cancer      (hemorrhoids)    Surgeons: Hoang Jackson MD Anesthesiologist: Kenton Schaeffer CRNA    Anesthesia Type: MAC ASA Status: 3          Anesthesia Type: MAC    Vitals Value Taken Time   BP 92/58 11/18/22 1005   Temp 97 11/18/22 1005   Pulse 61 11/18/22 1005   Resp 16 11/18/22 1005   SpO2 100 11/18/22 1005       [address] [place] [address] AN Post Evaluation:   Patient Evaluated in PACU  Patient Participation: complete - patient participated  Level of Consciousness: awake  Pain Score: 0  Pain Management: adequate  Airway Patency:patent  Dental exam unchanged from preop  Yes    Cardiovascular Status: acceptable  Respiratory Status: acceptable  Postoperative Hydration acceptable      Clive Rodriguez CRNA  11/18/2022 10:05 AM

## 2022-11-18 NOTE — OPERATIVE REPORT
[facility] Endoscopy Report      Preoperative Diagnosis:  - family history of colon cancer   - colon cancer screening    Postoperative Diagnosis:  - small internal hemorrhoids      Procedure:    Colonoscopy       Surgeon:  Edmund Rojas M.D. Anesthesia:  MAC sedation    Technique:  After informed consent, the patient was placed in the left lateral recumbent position. Digital rectal examination revealed no palpable intraluminal abnormalities. An Olympus variable stiffness 190 series HD colonoscope was inserted into the rectum and advanced under direct vision by following the lumen to the cecum. The colon was examined upon withdrawal in the left lateral position. The procedures were well tolerated without immediate complication. Findings:  The preparation of the colon was good. The terminal ileum was examined for 4 cm and visually normal.  The ileocecal valve was well preserved. The visualized colonic mucosa from the cecum to the anal verge was normal with an intact vascular pattern. Small internal hemorrhoids. Estimated blood loss-none  Specimens-none      Impression:  - small internal hemorrhoids    Recommendations:  - Post procedure instructions given  - Repeat colonoscopy in 5 years  - Symptomatic treatment of hemorrhoids          Janey Gamez.  Pipe Beltrán MD  11/18/2022  10:05 AM

## 2022-11-18 NOTE — PROGRESS NOTES
Called to see the patient due to double vision. When I did se the patient her vision was restored to normal. She did state double vision does happen to her often and usually resolves on its own. Patient to de discharged home and follow up with her regular MD or the ER if it persists. Dr Caryn Bethea also saw the patient and agrees with plan.

## 2022-11-18 NOTE — DISCHARGE INSTRUCTIONS

## 2022-11-21 ENCOUNTER — MED REC SCAN ONLY (OUTPATIENT)
Facility: CLINIC | Age: 47
End: 2022-11-21

## 2023-01-10 ENCOUNTER — NURSE TRIAGE (OUTPATIENT)
Dept: FAMILY MEDICINE CLINIC | Facility: CLINIC | Age: 48
End: 2023-01-10

## 2023-01-10 NOTE — TELEPHONE ENCOUNTER
Yes you can. I have a family emergency situation going on and there is a chance I may have to cancel.  But for now, you can put her in

## 2023-01-13 ENCOUNTER — OFFICE VISIT (OUTPATIENT)
Dept: FAMILY MEDICINE CLINIC | Facility: CLINIC | Age: 48
End: 2023-01-13

## 2023-01-13 VITALS
SYSTOLIC BLOOD PRESSURE: 113 MMHG | BODY MASS INDEX: 20 KG/M2 | WEIGHT: 114.13 LBS | HEART RATE: 73 BPM | DIASTOLIC BLOOD PRESSURE: 77 MMHG | TEMPERATURE: 99 F

## 2023-01-13 DIAGNOSIS — M53.3 SACROILIAC PAIN: Primary | ICD-10-CM

## 2023-01-13 PROCEDURE — 99213 OFFICE O/P EST LOW 20 MIN: CPT | Performed by: FAMILY MEDICINE

## 2023-01-13 PROCEDURE — 3074F SYST BP LT 130 MM HG: CPT | Performed by: FAMILY MEDICINE

## 2023-01-13 PROCEDURE — 3078F DIAST BP <80 MM HG: CPT | Performed by: FAMILY MEDICINE

## 2023-03-22 ENCOUNTER — PATIENT MESSAGE (OUTPATIENT)
Dept: FAMILY MEDICINE CLINIC | Facility: CLINIC | Age: 48
End: 2023-03-22

## 2023-03-23 NOTE — TELEPHONE ENCOUNTER
From: Kraig Giraldo  To: Jorgito Yanez DO  Sent: 3/22/2023 12:51 PM CDT  Subject: next appointment? Hi Dr. Braeden Cuevas,  Just wanted to let you know I scheduled my next annual exam with you (due in May but you don't have an opening until June). We are actually moving to [place] in two weeks, but I found out our insurance doesn't have any in-network providers there. So I will be traveling back for appointments with you and with Dr. Liliya Blake until we can figure that out. I WILL need prescription refills before that appointment. ..

## 2023-05-08 ENCOUNTER — OFFICE VISIT (OUTPATIENT)
Dept: FAMILY MEDICINE CLINIC | Facility: CLINIC | Age: 48
End: 2023-05-08

## 2023-05-08 ENCOUNTER — LAB ENCOUNTER (OUTPATIENT)
Dept: LAB | Age: 48
End: 2023-05-08
Attending: FAMILY MEDICINE
Payer: COMMERCIAL

## 2023-05-08 VITALS
HEART RATE: 74 BPM | BODY MASS INDEX: 18.89 KG/M2 | TEMPERATURE: 98 F | WEIGHT: 112 LBS | RESPIRATION RATE: 16 BRPM | DIASTOLIC BLOOD PRESSURE: 70 MMHG | SYSTOLIC BLOOD PRESSURE: 108 MMHG | HEIGHT: 64.5 IN

## 2023-05-08 DIAGNOSIS — Z12.31 ENCOUNTER FOR SCREENING MAMMOGRAM FOR BREAST CANCER: ICD-10-CM

## 2023-05-08 DIAGNOSIS — Z00.00 ROUTINE PHYSICAL EXAMINATION: Primary | ICD-10-CM

## 2023-05-08 DIAGNOSIS — E27.1 ADDISON'S DISEASE (HCC): ICD-10-CM

## 2023-05-08 DIAGNOSIS — Z01.419 ENCOUNTER FOR ANNUAL ROUTINE GYNECOLOGICAL EXAMINATION: ICD-10-CM

## 2023-05-08 DIAGNOSIS — Z00.00 ROUTINE PHYSICAL EXAMINATION: ICD-10-CM

## 2023-05-08 DIAGNOSIS — G25.81 RLS (RESTLESS LEGS SYNDROME): ICD-10-CM

## 2023-05-08 LAB
ALBUMIN SERPL-MCNC: 3.7 G/DL (ref 3.4–5)
ALBUMIN/GLOB SERPL: 1.3 {RATIO} (ref 1–2)
ALP LIVER SERPL-CCNC: 38 U/L
ALT SERPL-CCNC: 20 U/L
ANION GAP SERPL CALC-SCNC: 4 MMOL/L (ref 0–18)
AST SERPL-CCNC: 17 U/L (ref 15–37)
BILIRUB SERPL-MCNC: 0.2 MG/DL (ref 0.1–2)
BUN BLD-MCNC: 16 MG/DL (ref 7–18)
BUN/CREAT SERPL: 18.2 (ref 10–20)
CALCIUM BLD-MCNC: 8.9 MG/DL (ref 8.5–10.1)
CHLORIDE SERPL-SCNC: 108 MMOL/L (ref 98–112)
CHOLEST SERPL-MCNC: 165 MG/DL (ref ?–200)
CO2 SERPL-SCNC: 26 MMOL/L (ref 21–32)
CREAT BLD-MCNC: 0.88 MG/DL
DEPRECATED RDW RBC AUTO: 43.1 FL (ref 35.1–46.3)
ERYTHROCYTE [DISTWIDTH] IN BLOOD BY AUTOMATED COUNT: 12.4 % (ref 11–15)
FASTING PATIENT LIPID ANSWER: NO
FASTING STATUS PATIENT QL REPORTED: NO
GFR SERPLBLD BASED ON 1.73 SQ M-ARVRAT: 81 ML/MIN/1.73M2 (ref 60–?)
GLOBULIN PLAS-MCNC: 2.8 G/DL (ref 2.8–4.4)
GLUCOSE BLD-MCNC: 95 MG/DL (ref 70–99)
HCT VFR BLD AUTO: 43.3 %
HDLC SERPL-MCNC: 84 MG/DL (ref 40–59)
HGB BLD-MCNC: 14.2 G/DL
LDLC SERPL CALC-MCNC: 74 MG/DL (ref ?–100)
MCH RBC QN AUTO: 31.1 PG (ref 26–34)
MCHC RBC AUTO-ENTMCNC: 32.8 G/DL (ref 31–37)
MCV RBC AUTO: 95 FL
NONHDLC SERPL-MCNC: 81 MG/DL (ref ?–130)
OSMOLALITY SERPL CALC.SUM OF ELEC: 287 MOSM/KG (ref 275–295)
PLATELET # BLD AUTO: 282 10(3)UL (ref 150–450)
POTASSIUM SERPL-SCNC: 3.9 MMOL/L (ref 3.5–5.1)
PROT SERPL-MCNC: 6.5 G/DL (ref 6.4–8.2)
RBC # BLD AUTO: 4.56 X10(6)UL
SODIUM SERPL-SCNC: 138 MMOL/L (ref 136–145)
TRIGL SERPL-MCNC: 30 MG/DL (ref 30–149)
TSI SER-ACNC: 0.69 MIU/ML (ref 0.36–3.74)
VLDLC SERPL CALC-MCNC: 5 MG/DL (ref 0–30)
WBC # BLD AUTO: 7.9 X10(3) UL (ref 4–11)

## 2023-05-08 PROCEDURE — 80061 LIPID PANEL: CPT

## 2023-05-08 PROCEDURE — 36415 COLL VENOUS BLD VENIPUNCTURE: CPT

## 2023-05-08 PROCEDURE — 3074F SYST BP LT 130 MM HG: CPT | Performed by: FAMILY MEDICINE

## 2023-05-08 PROCEDURE — 3078F DIAST BP <80 MM HG: CPT | Performed by: FAMILY MEDICINE

## 2023-05-08 PROCEDURE — 80053 COMPREHEN METABOLIC PANEL: CPT

## 2023-05-08 PROCEDURE — 84443 ASSAY THYROID STIM HORMONE: CPT

## 2023-05-08 PROCEDURE — 85027 COMPLETE CBC AUTOMATED: CPT

## 2023-05-08 PROCEDURE — 99396 PREV VISIT EST AGE 40-64: CPT | Performed by: FAMILY MEDICINE

## 2023-05-08 PROCEDURE — 3008F BODY MASS INDEX DOCD: CPT | Performed by: FAMILY MEDICINE

## 2023-05-08 RX ORDER — LEVONORGESTREL AND ETHINYL ESTRADIOL 0.1-0.02MG
1 KIT ORAL DAILY
Qty: 84 TABLET | Refills: 5 | Status: SHIPPED | OUTPATIENT
Start: 2023-05-08 | End: 2023-05-08

## 2023-05-08 RX ORDER — LEVONORGESTREL AND ETHINYL ESTRADIOL 0.1-0.02MG
1 KIT ORAL DAILY
Qty: 84 TABLET | Refills: 5 | Status: SHIPPED | OUTPATIENT
Start: 2023-05-08

## 2023-05-11 LAB — HPV I/H RISK 1 DNA SPEC QL NAA+PROBE: NEGATIVE

## 2023-06-19 ENCOUNTER — PATIENT MESSAGE (OUTPATIENT)
Dept: ENDOCRINOLOGY CLINIC | Facility: CLINIC | Age: 48
End: 2023-06-19

## 2023-06-19 DIAGNOSIS — E27.49 GLUCOCORTICOID DEFICIENCY (HCC): ICD-10-CM

## 2023-06-19 RX ORDER — HYDROCORTISONE 10 MG/1
TABLET ORAL
Qty: 270 TABLET | Refills: 3 | Status: SHIPPED | OUTPATIENT
Start: 2023-06-19

## 2023-06-19 NOTE — TELEPHONE ENCOUNTER
From: Liya Giraldo  To: Jun Rivera MD  Sent: 6/19/2023 8:31 AM CDT  Subject: hydrocortisone refill    Hi! I tried to refill my prescription last Monday and we are still waiting for authorization - is there anything wrong? I did make an appointment but the first available was in October. ..

## 2023-07-11 RX ORDER — LEVOTHYROXINE SODIUM 0.1 MG/1
100 TABLET ORAL DAILY
Qty: 90 TABLET | Refills: 0 | Status: SHIPPED | OUTPATIENT
Start: 2023-07-11

## 2023-10-02 ENCOUNTER — OFFICE VISIT (OUTPATIENT)
Dept: ENDOCRINOLOGY CLINIC | Facility: CLINIC | Age: 48
End: 2023-10-02

## 2023-10-02 VITALS
DIASTOLIC BLOOD PRESSURE: 72 MMHG | SYSTOLIC BLOOD PRESSURE: 108 MMHG | HEART RATE: 81 BPM | WEIGHT: 112 LBS | BODY MASS INDEX: 19 KG/M2

## 2023-10-02 DIAGNOSIS — E06.3 HYPOTHYROIDISM DUE TO HASHIMOTO'S THYROIDITIS: ICD-10-CM

## 2023-10-02 DIAGNOSIS — E03.8 HYPOTHYROIDISM DUE TO HASHIMOTO'S THYROIDITIS: ICD-10-CM

## 2023-10-02 DIAGNOSIS — E27.49 GLUCOCORTICOID DEFICIENCY (HCC): ICD-10-CM

## 2023-10-02 DIAGNOSIS — E55.9 VITAMIN D DEFICIENCY: ICD-10-CM

## 2023-10-02 DIAGNOSIS — E27.1 ADDISON'S DISEASE (HCC): Primary | ICD-10-CM

## 2023-10-02 PROCEDURE — 3074F SYST BP LT 130 MM HG: CPT | Performed by: INTERNAL MEDICINE

## 2023-10-02 PROCEDURE — 3078F DIAST BP <80 MM HG: CPT | Performed by: INTERNAL MEDICINE

## 2023-10-02 PROCEDURE — 99214 OFFICE O/P EST MOD 30 MIN: CPT | Performed by: INTERNAL MEDICINE

## 2023-10-02 RX ORDER — HYDROCORTISONE 10 MG/1
TABLET ORAL
Qty: 270 TABLET | Refills: 3 | Status: SHIPPED | OUTPATIENT
Start: 2023-10-02

## 2023-10-02 RX ORDER — FLUDROCORTISONE ACETATE 0.1 MG/1
0.1 TABLET ORAL 2 TIMES DAILY
Qty: 180 TABLET | Refills: 3 | Status: SHIPPED | OUTPATIENT
Start: 2023-10-02

## 2023-10-24 ENCOUNTER — PATIENT MESSAGE (OUTPATIENT)
Dept: ENDOCRINOLOGY CLINIC | Facility: CLINIC | Age: 48
End: 2023-10-24

## 2023-10-25 RX ORDER — LEVOTHYROXINE SODIUM 0.1 MG/1
100 TABLET ORAL DAILY
Qty: 90 TABLET | Refills: 0 | Status: SHIPPED | OUTPATIENT
Start: 2023-10-25

## 2023-10-25 NOTE — TELEPHONE ENCOUNTER
From: Alvaro Giraldo  To: Keven Coulter  Sent: 10/24/2023 5:43 PM CDT  Subject: levothyroxin refill    Hi! I thought you refilled my prescriptions at my appointment - but now I am totally out of levothyroxin! No refills. I requested one, but it hasn't gone through yet. I need it to go to the [facility] in [place]. ... Thanks!

## 2024-01-20 NOTE — TELEPHONE ENCOUNTER
LOV: 10/02/23     Next office visit: RTC 1 Year     Last filled: 10/25/23     Order pended and routed

## 2024-01-22 RX ORDER — LEVOTHYROXINE SODIUM 0.1 MG/1
100 TABLET ORAL DAILY
Qty: 90 TABLET | Refills: 0 | Status: SHIPPED | OUTPATIENT
Start: 2024-01-22

## (undated) DEVICE — 35 ML SYRINGE REGULAR TIP: Brand: MONOJECT

## (undated) DEVICE — KIT ENDO ORCAPOD 160/180/190

## (undated) DEVICE — KIT CLEAN ENDOKIT 1.1OZ GOWNX2

## (undated) DEVICE — Device: Brand: DEFENDO AIR/WATER/SUCTION AND BIOPSY VALVE

## (undated) DEVICE — MEDI-VAC NON-CONDUCTIVE SUCTION TUBING 6MM X 1.8M (6FT.) L: Brand: CARDINAL HEALTH

## (undated) DEVICE — LINE MNTR ADLT SET O2 INTMD

## (undated) DEVICE — ENDOSCOPY PACK - LOWER: Brand: MEDLINE INDUSTRIES, INC.

## (undated) NOTE — LETTER
Lis Hawkins Do  [facility] Andrea Mcclure  [address] [place] [address]  [facility]       08/22/22        Patient: Guicho Duarte   YOB: 1975   Date of Visit: 8/22/2022       Dear  Dr. En Sher DO,      Thank you for referring Guicho Duarte to my practice. Please find my assessment and plan below. Sincerely,   Paige Burch. Margoth Encinas MD   [address] [place] [address]  [address] [facility] [id number]    Document electronically generated by:  Paige Burch.  Margoth Encinas MD

## (undated) NOTE — MR AVS SNAPSHOT
[facility]  [address]  [phone]               Thank you for choosing us for your health care visit with Monica Clark NP.   We are glad to serve you and happy to provide you with this summary of your vis The infection causes inflammation in the urethra and bladder. This causes many of the symptoms.  The most common symptoms of a bladder infection are:  · Pain or burning when urinating  · Having to urinate more often than usual  · Urgent need to urinate  · O kidney disease, talk with your healthcare provider before using these medicines. Also talk with your provider if you've ever had a stomach ulcer or gastrointestinal bleeding, or are taking blood-thinner medicines.   · If you are given phenazopydridine to re Call your healthcare provider right away if any of these occur:  · Fever of 100.4ºF (38. 0ºC) or higher, or as directed by your healthcare provider  · Symptoms are not better by the third day of treatment  · Back or belly (abdominal) pain that gets worse  · Syringe 25G X 1\" 3 ML Misc   for IM injection           Vitamin B12-Folic Acid 277-149 MCG Tabs   Take 1 tablet by mouth daily. Vitamin D3 400 units Caps   Take 400 Units by mouth daily.            Zaleplon 5 MG Caps   Take 1 capsule by mouth as

## (undated) NOTE — LETTER
[address]  [address] [place] [address], [place]  Authorization for Surgical Operation and Procedure                                                                                           1. I hereby Moira Alves MD, my physician and his/her assistants (if applicable), which may include medical students, residents, and/or fellows, to perform the following surgical operation/ procedure and administer such anesthesia as may be determined necessary by my physician: Operation/Procedure name (s) COLONOSCOPY on Breanna Giraldo   2. I recognize that during the surgical operation/procedure, unforeseen conditions may necessitate additional or different procedures than those listed above. I, therefore, further authorize and request that the above-named surgeon, assistants, or designees perform such procedures as are, in their judgment, necessary and desirable. 3.   My surgeon/physician has discussed prior to my surgery the potential benefits, risks and side effects of this procedure; the likelihood of achieving goals; and potential problems that might occur during recuperation. They also discussed reasonable alternatives to the procedure, including risks, benefits, and side effects related to the alternatives and risks related to not receiving this procedure. I have had all my questions answered and I acknowledge that no guarantee has been made as to the result that may be obtained. 4.   Should the need arise during my operation/procedure, which includes change of level of care prior to discharge, I also consent to the administration of blood and/or blood products. Further, I understand that despite careful testing and screening of blood or blood products by collecting agencies, I may still be subject to ill effects as a result of receiving a blood transfusion and/or blood products.   The following are some, but not all, of the potential risks that can occur: fever and allergic reactions, hemolytic reactions, transmission of diseases such as Hepatitis, AIDS and Cytomegalovirus (CMV) and fluid overload. In the event that I wish to have an autologous transfusion of my own blood, or a directed donor transfusion, I will discuss this with my physician. Check only if Refusing Blood or Blood Products  I understand refusal of blood or blood products as deemed necessary by my physician may have serious consequences to my condition to include possible death. I hereby assume responsibility for my refusal and release the hospital, its personnel, and my physicians from any responsibility for the consequences of my refusal.           ____ Refuse      5. I authorize the use of any specimen, organs, tissues, body parts or foreign objects that may be removed from my body during the operation/procedure for diagnosis, research or teaching purposes and their subsequent disposal by hospital authorities. I also authorize the release of specimen test results and/or written reports to my treating physician on the hospital medical staff or other referring or consulting physicians involved in my care, at the discretion of the Pathologist or my treating physician. 6.   I consent to the photographing or videotaping of the operations or procedures to be performed, including appropriate portions of my body for medical, scientific, or educational purposes, provided my identity is not revealed by the pictures or by descriptive texts accompanying them. If the procedure has been photographed/videotaped, the surgeon will obtain the original picture, image, videotape or CD. The hospital will not be responsible for storage, release or maintenance of the picture, image, tape or CD.    7.   I consent to the presence of a  or observers in the operating room as deemed necessary by my physician or their designees.     8.   I recognize that in the event my procedure results in extended X-Ray/fluoroscopy time, I may develop a skin reaction. 9. If I have a Do Not Attempt Resuscitation (DNAR) order in place, that status will be suspended while in the operating room, procedural suite, and during the recovery period unless otherwise explicitly stated by me (or a person authorized to consent on my behalf). The surgeon or my attending physician will determine when the applicable recovery period ends for purposes of reinstating the DNAR order. 10. Patients having a sterilization procedure: I understand that if the procedure is successful the results will be permanent and it will therefore be impossible for me to inseminate, conceive, or bear children. I also understand that the procedure is intended to result in sterility, although the result has not been guaranteed. 11. I acknowledge that my physician has explained sedation/analgesia administration to me including the risk and benefits I consent to the administration of sedation/analgesia as may be necessary or desirable in the judgment of my physician. I CERTIFY THAT I HAVE READ AND FULLY UNDERSTAND THE ABOVE CONSENT TO OPERATION and/or OTHER PROCEDURE.     _________________________________________ _________________________________     ___________________________________  Signature of Patient     Signature of Responsible Person                   Printed Name of Responsible Person                              _________________________________________ ______________________________        ___________________________________  Signature of Witness         Date  Time         Relationship to Patient    STATEMENT OF PHYSICIAN My signature below affirms that prior to the time of the procedure; I have explained to the patient and/or his/her legal representative, the risks and benefits involved in the proposed treatment and any reasonable alternative to the proposed treatment.  I have also explained the risks and benefits involved in refusal of the proposed treatment and alternatives to the proposed treatment and have answered the patient's questions.  If I have a significant financial interest in a co-management agreement or a significant financial interest in any product or implant, or other significant relationship used in this procedure/surgery, I have disclosed this and had a discussion with my patient.     _______________________________________________________________ _____________________________  Keren Kaplan of Physician)                                                                                         (Date)                                   (Time)  Patient Name: Mello Dill    : 1975   Printed: 2022      Medical Record #: [id number]                                              Page 1 of 1

## (undated) NOTE — MR AVS SNAPSHOT
[facility]  [address], [address] [place] [address]  [phone]               Thank you for choosing us for your health care visit with Nurse.   We are glad to serve you and happy to provide you with this summary of your visit Take 1 tablet (12.5 mg total) by mouth 3 (three) times daily as needed. Commonly known as:  ANTIVERT           Omega-3 Fatty Acids 1000 MG Caps   Take  by mouth.            Syringe 25G X 1\" 3 ML Misc   for IM injection           Vitamin B12-Folic Acid 50

## (undated) NOTE — LETTER
12/5/2019              [address] Giraldo        [address]         Dear Judy Rai,    [facility] records indicate that the tests ordered for you by Stephan Connor MD  have not been done.   If you have, in fact, already completed the

## (undated) NOTE — MR AVS SNAPSHOT
[facility]  [address], [address] [place] [address]  [phone]               Thank you for choosing us for your health care visit with Nurse.   We are glad to serve you and happy to provide you with this summary of your visit Commonly known as:  SONATA                   Immunizations Administered in the Office Today     HEP B       MyChart     Visit MyChart  You can access your MyChart to more actively manage your health care and view more details from this visit by going to ht

## (undated) NOTE — LETTER
[address]  Authorization for Invasive Procedure                                                                                           1. I hereby authorize Char Arango MD, my physician and his/her assistants (if applicable), which may include medical students, residents, and/or fellows, to perform the following surgical operation/ procedure and administer such anesthesia as may be determined necessary by my physician: Operation/Procedure name (s) COLONOSCOPY on Yuliana Giraldo   2. I recognize that during the surgical operation/procedure, unforeseen conditions may necessitate additional or different procedures than those listed above. I, therefore, further authorize and request that the above-named surgeon, assistants, or designees perform such procedures as are, in their judgment, necessary and desirable. 3.   My surgeon/physician has discussed prior to my surgery the potential benefits, risks and side effects of this procedure; the likelihood of achieving goals; and potential problems that might occur during recuperation. They also discussed reasonable alternatives to the procedure, including risks, benefits, and side effects related to the alternatives and risks related to not receiving this procedure. I have had all my questions answered and I acknowledge that no guarantee has been made as to the result that may be obtained. 4.   Should the need arise during my operation/procedure, which includes change of level of care prior to discharge, I also consent to the administration of blood and/or blood products. Further, I understand that despite careful testing and screening of blood or blood products by collecting agencies, I may still be subject to ill effects as a result of receiving a blood transfusion and/or blood products.   The following are some, but not all, of the potential risks that can occur: fever and allergic reactions, hemolytic reactions, transmission of diseases such as Hepatitis, AIDS and Cytomegalovirus (CMV) and fluid overload. In the event that I wish to have an autologous transfusion of my own blood, or a directed donor transfusion, I will discuss this with my physician. Check only if Refusing Blood or Blood Products  I understand refusal of blood or blood products as deemed necessary by my physician may have serious consequences to my condition to include possible death. I hereby assume responsibility for my refusal and release the hospital, its personnel, and my physicians from any responsibility for the consequences of my refusal.    o  Refuse   5. I authorize the use of any specimen, organs, tissues, body parts or foreign objects that may be removed from my body during the operation/procedure for diagnosis, research or teaching purposes and their subsequent disposal by hospital authorities. I also authorize the release of specimen test results and/or written reports to my treating physician on the hospital medical staff or other referring or consulting physicians involved in my care, at the discretion of the Pathologist or my treating physician. 6.   I consent to the photographing or videotaping of the operations or procedures to be performed, including appropriate portions of my body for medical, scientific, or educational purposes, provided my identity is not revealed by the pictures or by descriptive texts accompanying them. If the procedure has been photographed/videotaped, the surgeon will obtain the original picture, image, videotape or CD. The hospital will not be responsible for storage, release or maintenance of the picture, image, tape or CD.    7.   I consent to the presence of a  or observers in the operating room as deemed necessary by my physician or their designees.     8.   I recognize that in the event my procedure results in extended X-Ray/fluoroscopy time, I may develop a skin reaction. 9. If I have a Do Not Attempt Resuscitation (DNAR) order in place, that status will be suspended while in the operating room, procedural suite, and during the recovery period unless otherwise explicitly stated by me (or a person authorized to consent on my behalf). The surgeon or my attending physician will determine when the applicable recovery period ends for purposes of reinstating the DNAR order. 10. Patients having a sterilization procedure: I understand that if the procedure is successful the results will be permanent and it will therefore be impossible for me to inseminate, conceive, or bear children. I also understand that the procedure is intended to result in sterility, although the result has not been guaranteed. 11. I acknowledge that my physician has explained sedation/analgesia administration to me including the risk and benefits I consent to the administration of sedation/analgesia as may be necessary or desirable in the judgment of my physician. I CERTIFY THAT I HAVE READ AND FULLY UNDERSTAND THE ABOVE CONSENT TO OPERATION and/or OTHER PROCEDURE.     _________________________________________ _________________________________     ___________________________________  Signature of Patient     Signature of Responsible Person                   Printed Name of Responsible Person                              _________________________________________ ______________________________        ___________________________________  Signature of Witness         Date  Time         Relationship to Patient    STATEMENT OF PHYSICIAN My signature below affirms that prior to the time of the procedure; I have explained to the patient and/or his/her legal representative, the risks and benefits involved in the proposed treatment and any reasonable alternative to the proposed treatment.  I have also explained the risks and benefits involved in refusal of the proposed treatment and alternatives to the proposed treatment and have answered the patient's questions.  If I have a significant financial interest in a co-management agreement or a significant financial interest in any product or implant, or other significant relationship used in this procedure/surgery, I have disclosed this and had a discussion with my patient.     _______________________________________________________________ _____________________________  Han Lara Physician)                                                                                         (Date)                                   (Time)  Patient Name: Chilo La    : 1975   Printed: 2022      Medical Record #: [id number]                                              Page 1 of 1

## (undated) NOTE — LETTER
[facility]  [address]  [address] [facility] ([place], [place])  [place]       08/22/22        Patient: Maia Cesar   YOB: 1975   Date of Visit: 8/22/2022       Dear  Dr. Amee Sorto DO,      Thank you for referring Maia Cesar to my practice. Please find my assessment and plan below. ASSESSMENT AND PLAN    1. Neck mass  Normal exam.  She does have a family history of thyroid cancer therefore I did recommend she undergo a baseline thyroid ultrasound to rule out any nodularity of her thyroid. 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 I did discuss with her that this is simply just a variant of normal and is visibly and palpably notable in her case due to her very thin neck. I did reassure her that nothing bad is going on no further intervention is indicated. We will call her with results of thyroid ultrasound. Sincerely,   John Reed. Lynn Sood MD   [address] [place] [address]  2017 [address] [id number]    Document electronically generated by:  John Reed.  Lynn Sood MD